# Patient Record
Sex: FEMALE | Race: WHITE | NOT HISPANIC OR LATINO | Employment: FULL TIME | URBAN - METROPOLITAN AREA
[De-identification: names, ages, dates, MRNs, and addresses within clinical notes are randomized per-mention and may not be internally consistent; named-entity substitution may affect disease eponyms.]

---

## 2017-01-03 ENCOUNTER — ALLSCRIPTS OFFICE VISIT (OUTPATIENT)
Dept: OTHER | Facility: OTHER | Age: 42
End: 2017-01-03

## 2017-01-03 LAB
GLUCOSE (HISTORICAL): NORMAL
PROT UR STRIP-MCNC: NORMAL MG/DL

## 2017-01-13 ENCOUNTER — ALLSCRIPTS OFFICE VISIT (OUTPATIENT)
Dept: OTHER | Facility: OTHER | Age: 42
End: 2017-01-13

## 2017-01-13 LAB
GLUCOSE (HISTORICAL): NORMAL
PROT UR STRIP-MCNC: NORMAL MG/DL

## 2017-01-23 ENCOUNTER — GENERIC CONVERSION - ENCOUNTER (OUTPATIENT)
Dept: OTHER | Facility: OTHER | Age: 42
End: 2017-01-23

## 2017-01-23 ENCOUNTER — ALLSCRIPTS OFFICE VISIT (OUTPATIENT)
Dept: PERINATAL CARE | Facility: CLINIC | Age: 42
End: 2017-01-23
Payer: COMMERCIAL

## 2017-01-23 PROCEDURE — 76816 OB US FOLLOW-UP PER FETUS: CPT | Performed by: OBSTETRICS & GYNECOLOGY

## 2017-01-31 ENCOUNTER — ALLSCRIPTS OFFICE VISIT (OUTPATIENT)
Dept: OTHER | Facility: OTHER | Age: 42
End: 2017-01-31

## 2017-01-31 LAB
GLUCOSE (HISTORICAL): NEGATIVE
PROT UR STRIP-MCNC: NEGATIVE MG/DL

## 2017-02-01 ENCOUNTER — GENERIC CONVERSION - ENCOUNTER (OUTPATIENT)
Dept: OTHER | Facility: OTHER | Age: 42
End: 2017-02-01

## 2017-02-01 ENCOUNTER — ALLSCRIPTS OFFICE VISIT (OUTPATIENT)
Dept: PERINATAL CARE | Facility: CLINIC | Age: 42
End: 2017-02-01
Payer: COMMERCIAL

## 2017-02-01 PROCEDURE — 76815 OB US LIMITED FETUS(S): CPT | Performed by: OBSTETRICS & GYNECOLOGY

## 2017-02-01 PROCEDURE — 59025 FETAL NON-STRESS TEST: CPT | Performed by: OBSTETRICS & GYNECOLOGY

## 2017-02-06 ENCOUNTER — ALLSCRIPTS OFFICE VISIT (OUTPATIENT)
Dept: OTHER | Facility: OTHER | Age: 42
End: 2017-02-06

## 2017-02-06 LAB
GLUCOSE (HISTORICAL): NORMAL
PROT UR STRIP-MCNC: NORMAL MG/DL

## 2017-02-08 ENCOUNTER — GENERIC CONVERSION - ENCOUNTER (OUTPATIENT)
Dept: OTHER | Facility: OTHER | Age: 42
End: 2017-02-08

## 2017-02-08 ENCOUNTER — ALLSCRIPTS OFFICE VISIT (OUTPATIENT)
Dept: PERINATAL CARE | Facility: CLINIC | Age: 42
End: 2017-02-08
Payer: COMMERCIAL

## 2017-02-08 PROCEDURE — 59025 FETAL NON-STRESS TEST: CPT | Performed by: OBSTETRICS & GYNECOLOGY

## 2017-02-08 PROCEDURE — 76815 OB US LIMITED FETUS(S): CPT | Performed by: OBSTETRICS & GYNECOLOGY

## 2017-02-14 ENCOUNTER — ALLSCRIPTS OFFICE VISIT (OUTPATIENT)
Dept: OTHER | Facility: OTHER | Age: 42
End: 2017-02-14

## 2017-02-14 LAB
GLUCOSE (HISTORICAL): NORMAL
PROT UR STRIP-MCNC: NORMAL MG/DL

## 2017-02-15 ENCOUNTER — ALLSCRIPTS OFFICE VISIT (OUTPATIENT)
Dept: PERINATAL CARE | Facility: CLINIC | Age: 42
End: 2017-02-15
Payer: COMMERCIAL

## 2017-02-15 ENCOUNTER — APPOINTMENT (OUTPATIENT)
Dept: PERINATAL CARE | Facility: CLINIC | Age: 42
End: 2017-02-15
Payer: COMMERCIAL

## 2017-02-15 ENCOUNTER — GENERIC CONVERSION - ENCOUNTER (OUTPATIENT)
Dept: OTHER | Facility: OTHER | Age: 42
End: 2017-02-15

## 2017-02-15 PROCEDURE — 76818 FETAL BIOPHYS PROFILE W/NST: CPT | Performed by: OBSTETRICS & GYNECOLOGY

## 2017-02-20 ENCOUNTER — ALLSCRIPTS OFFICE VISIT (OUTPATIENT)
Dept: OTHER | Facility: OTHER | Age: 42
End: 2017-02-20

## 2017-02-20 LAB
GLUCOSE (HISTORICAL): NORMAL
PROT UR STRIP-MCNC: NORMAL MG/DL

## 2017-02-22 ENCOUNTER — GENERIC CONVERSION - ENCOUNTER (OUTPATIENT)
Dept: OTHER | Facility: OTHER | Age: 42
End: 2017-02-22

## 2017-02-22 ENCOUNTER — APPOINTMENT (OUTPATIENT)
Dept: PERINATAL CARE | Facility: CLINIC | Age: 42
End: 2017-02-22
Payer: COMMERCIAL

## 2017-02-22 ENCOUNTER — ALLSCRIPTS OFFICE VISIT (OUTPATIENT)
Dept: PERINATAL CARE | Facility: CLINIC | Age: 42
End: 2017-02-22
Payer: COMMERCIAL

## 2017-02-22 PROCEDURE — 76818 FETAL BIOPHYS PROFILE W/NST: CPT | Performed by: OBSTETRICS & GYNECOLOGY

## 2017-02-23 ENCOUNTER — ANESTHESIA EVENT (INPATIENT)
Dept: LABOR AND DELIVERY | Facility: HOSPITAL | Age: 42
End: 2017-02-23
Payer: COMMERCIAL

## 2017-02-23 RX ORDER — SODIUM CHLORIDE, SODIUM LACTATE, POTASSIUM CHLORIDE, CALCIUM CHLORIDE 600; 310; 30; 20 MG/100ML; MG/100ML; MG/100ML; MG/100ML
125 INJECTION, SOLUTION INTRAVENOUS CONTINUOUS
Status: CANCELLED | OUTPATIENT
Start: 2017-02-24

## 2017-02-24 ENCOUNTER — ANESTHESIA (INPATIENT)
Dept: LABOR AND DELIVERY | Facility: HOSPITAL | Age: 42
End: 2017-02-24
Payer: COMMERCIAL

## 2017-02-24 ENCOUNTER — HOSPITAL ENCOUNTER (INPATIENT)
Facility: HOSPITAL | Age: 42
LOS: 3 days | Discharge: HOME | End: 2017-02-27
Attending: OBSTETRICS & GYNECOLOGY | Admitting: OBSTETRICS & GYNECOLOGY
Payer: COMMERCIAL

## 2017-02-24 DIAGNOSIS — Z98.891 PREVIOUS CESAREAN SECTION: ICD-10-CM

## 2017-02-24 PROBLEM — E03.9 HYPOTHYROIDISM: Status: ACTIVE | Noted: 2017-02-24

## 2017-02-24 PROBLEM — O99.820 GBS (GROUP B STREPTOCOCCUS CARRIER), +RV CULTURE, CURRENTLY PREGNANT: Status: ACTIVE | Noted: 2017-02-24

## 2017-02-24 PROBLEM — O24.419 GESTATIONAL DIABETES MELLITUS: Status: ACTIVE | Noted: 2017-02-24

## 2017-02-24 PROBLEM — O40.9XX0 POLYHYDRAMNIOS: Status: ACTIVE | Noted: 2017-02-24

## 2017-02-24 PROBLEM — Q27.0 SINGLE UMBILICAL ARTERY: Status: ACTIVE | Noted: 2017-02-24

## 2017-02-24 PROBLEM — E03.8 SUBCLINICAL HYPOTHYROIDISM: Status: ACTIVE | Noted: 2017-02-24

## 2017-02-24 PROBLEM — Z3A.39 39 WEEKS GESTATION OF PREGNANCY: Status: ACTIVE | Noted: 2017-02-24

## 2017-02-24 PROBLEM — N60.11 FIBROCYSTIC DISEASE OF BOTH BREASTS: Status: ACTIVE | Noted: 2017-02-24

## 2017-02-24 PROBLEM — N60.12 FIBROCYSTIC DISEASE OF BOTH BREASTS: Status: ACTIVE | Noted: 2017-02-24

## 2017-02-24 PROBLEM — O09.529 ELDERLY MULTIGRAVIDA: Status: ACTIVE | Noted: 2017-02-24

## 2017-02-24 LAB
ABO GROUP BLD: NORMAL
BASE EXCESS BLDCOA CALC-SCNC: -1.8 MMOL/L (ref 3–11)
BASE EXCESS BLDCOV CALC-SCNC: -2.8 MMOL/L (ref 1–9)
BLD GP AB SCN SERPL QL: NEGATIVE
ERYTHROCYTE [DISTWIDTH] IN BLOOD BY AUTOMATED COUNT: 13.5 % (ref 11.6–15.1)
HCO3 BLDCOA-SCNC: 25.2 MMOL/L (ref 17.3–27.3)
HCO3 BLDCOV-SCNC: 22 MMOL/L (ref 12.2–28.6)
HCT VFR BLD AUTO: 34.5 % (ref 34.8–46.1)
HGB BLD-MCNC: 11.3 G/DL (ref 11.5–15.4)
MCH RBC QN AUTO: 30.1 PG (ref 26.8–34.3)
MCHC RBC AUTO-ENTMCNC: 32.8 G/DL (ref 31.4–37.4)
MCV RBC AUTO: 92 FL (ref 82–98)
O2 CT VFR BLDCOA CALC: 4.9 ML/DL
OXYHGB MFR BLDCOA: 24.1 %
OXYHGB MFR BLDCOV: 75.4 %
PCO2 BLDCOA: 52 MM[HG] (ref 30–60)
PCO2 BLDCOV: 38.4 MM HG (ref 27–43)
PH BLDCOA: 7.3 [PH] (ref 7.23–7.43)
PH BLDCOV: 7.38 [PH] (ref 7.19–7.49)
PLATELET # BLD AUTO: 130 THOUSANDS/UL (ref 149–390)
PMV BLD AUTO: 10.6 FL (ref 8.9–12.7)
PO2 BLDCOA: 14.9 MM HG (ref 5–25)
PO2 BLDCOV: 34.4 MM HG (ref 15–45)
RBC # BLD AUTO: 3.76 MILLION/UL (ref 3.81–5.12)
RH BLD: POSITIVE
RPR SER QL: NORMAL
SAO2 % BLDCOV: 15.3 ML/DL
WBC # BLD AUTO: 6.73 THOUSAND/UL (ref 4.31–10.16)

## 2017-02-24 PROCEDURE — 86900 BLOOD TYPING SEROLOGIC ABO: CPT | Performed by: OBSTETRICS & GYNECOLOGY

## 2017-02-24 PROCEDURE — 86592 SYPHILIS TEST NON-TREP QUAL: CPT | Performed by: OBSTETRICS & GYNECOLOGY

## 2017-02-24 PROCEDURE — 82805 BLOOD GASES W/O2 SATURATION: CPT | Performed by: OBSTETRICS & GYNECOLOGY

## 2017-02-24 PROCEDURE — 0UB70ZZ EXCISION OF BILATERAL FALLOPIAN TUBES, OPEN APPROACH: ICD-10-PCS | Performed by: OBSTETRICS & GYNECOLOGY

## 2017-02-24 PROCEDURE — 86901 BLOOD TYPING SEROLOGIC RH(D): CPT | Performed by: OBSTETRICS & GYNECOLOGY

## 2017-02-24 PROCEDURE — 88302 TISSUE EXAM BY PATHOLOGIST: CPT | Performed by: OBSTETRICS & GYNECOLOGY

## 2017-02-24 PROCEDURE — 85027 COMPLETE CBC AUTOMATED: CPT | Performed by: OBSTETRICS & GYNECOLOGY

## 2017-02-24 PROCEDURE — 86850 RBC ANTIBODY SCREEN: CPT | Performed by: OBSTETRICS & GYNECOLOGY

## 2017-02-24 RX ORDER — INDOMETHACIN 25 MG/1
50 CAPSULE ORAL ONCE
Status: DISCONTINUED | OUTPATIENT
Start: 2017-02-24 | End: 2017-02-24 | Stop reason: CLARIF

## 2017-02-24 RX ORDER — DIPHENHYDRAMINE HYDROCHLORIDE 50 MG/ML
25 INJECTION INTRAMUSCULAR; INTRAVENOUS EVERY 6 HOURS PRN
Status: ACTIVE | OUTPATIENT
Start: 2017-02-24 | End: 2017-02-25

## 2017-02-24 RX ORDER — OXYTOCIN 10 [USP'U]/ML
INJECTION, SOLUTION INTRAMUSCULAR; INTRAVENOUS AS NEEDED
Status: DISCONTINUED | OUTPATIENT
Start: 2017-02-24 | End: 2017-02-24 | Stop reason: SURG

## 2017-02-24 RX ORDER — ACETAMINOPHEN 325 MG/1
650 TABLET ORAL EVERY 6 HOURS PRN
Status: DISCONTINUED | OUTPATIENT
Start: 2017-02-24 | End: 2017-02-27 | Stop reason: HOSPADM

## 2017-02-24 RX ORDER — EPHEDRINE SULFATE 50 MG/ML
INJECTION, SOLUTION INTRAVENOUS AS NEEDED
Status: DISCONTINUED | OUTPATIENT
Start: 2017-02-24 | End: 2017-02-24 | Stop reason: SURG

## 2017-02-24 RX ORDER — MORPHINE SULFATE 0.5 MG/ML
INJECTION, SOLUTION EPIDURAL; INTRATHECAL; INTRAVENOUS AS NEEDED
Status: DISCONTINUED | OUTPATIENT
Start: 2017-02-24 | End: 2017-02-24 | Stop reason: SURG

## 2017-02-24 RX ORDER — DOCUSATE SODIUM 100 MG/1
100 CAPSULE, LIQUID FILLED ORAL 2 TIMES DAILY
Status: DISCONTINUED | OUTPATIENT
Start: 2017-02-24 | End: 2017-02-27 | Stop reason: HOSPADM

## 2017-02-24 RX ORDER — DIPHENHYDRAMINE HCL 25 MG
25 TABLET ORAL EVERY 6 HOURS PRN
Status: DISCONTINUED | OUTPATIENT
Start: 2017-02-24 | End: 2017-02-27 | Stop reason: HOSPADM

## 2017-02-24 RX ORDER — CALCIUM CARBONATE 200(500)MG
1000 TABLET,CHEWABLE ORAL DAILY PRN
Status: DISCONTINUED | OUTPATIENT
Start: 2017-02-24 | End: 2017-02-27 | Stop reason: HOSPADM

## 2017-02-24 RX ORDER — ONDANSETRON 2 MG/ML
INJECTION INTRAMUSCULAR; INTRAVENOUS AS NEEDED
Status: DISCONTINUED | OUTPATIENT
Start: 2017-02-24 | End: 2017-02-24 | Stop reason: SURG

## 2017-02-24 RX ORDER — NALBUPHINE HCL 10 MG/ML
5 AMPUL (ML) INJECTION
Status: ACTIVE | OUTPATIENT
Start: 2017-02-24 | End: 2017-02-25

## 2017-02-24 RX ORDER — METOCLOPRAMIDE HYDROCHLORIDE 5 MG/ML
10 INJECTION INTRAMUSCULAR; INTRAVENOUS ONCE
Status: COMPLETED | OUTPATIENT
Start: 2017-02-24 | End: 2017-02-24

## 2017-02-24 RX ORDER — SODIUM CHLORIDE, SODIUM LACTATE, POTASSIUM CHLORIDE, CALCIUM CHLORIDE 600; 310; 30; 20 MG/100ML; MG/100ML; MG/100ML; MG/100ML
125 INJECTION, SOLUTION INTRAVENOUS CONTINUOUS
Status: DISCONTINUED | OUTPATIENT
Start: 2017-02-24 | End: 2017-02-27 | Stop reason: HOSPADM

## 2017-02-24 RX ORDER — OXYTOCIN/RINGER'S LACTATE 30/500 ML
PLASTIC BAG, INJECTION (ML) INTRAVENOUS
Status: COMPLETED
Start: 2017-02-24 | End: 2017-02-24

## 2017-02-24 RX ORDER — ONDANSETRON 2 MG/ML
4 INJECTION INTRAMUSCULAR; INTRAVENOUS EVERY 4 HOURS PRN
Status: DISPENSED | OUTPATIENT
Start: 2017-02-24 | End: 2017-02-25

## 2017-02-24 RX ORDER — ONDANSETRON 2 MG/ML
4 INJECTION INTRAMUSCULAR; INTRAVENOUS EVERY 8 HOURS PRN
Status: DISCONTINUED | OUTPATIENT
Start: 2017-02-24 | End: 2017-02-27 | Stop reason: HOSPADM

## 2017-02-24 RX ORDER — DIAPER,BRIEF,INFANT-TODD,DISP
1 EACH MISCELLANEOUS 4 TIMES DAILY PRN
Status: DISCONTINUED | OUTPATIENT
Start: 2017-02-24 | End: 2017-02-27 | Stop reason: HOSPADM

## 2017-02-24 RX ORDER — ACETAMINOPHEN 325 MG/1
650 TABLET ORAL EVERY 6 HOURS
Status: DISCONTINUED | OUTPATIENT
Start: 2017-02-24 | End: 2017-02-24

## 2017-02-24 RX ORDER — LEVOTHYROXINE SODIUM 0.05 MG/1
50 TABLET ORAL
Status: DISCONTINUED | OUTPATIENT
Start: 2017-02-25 | End: 2017-02-27 | Stop reason: HOSPADM

## 2017-02-24 RX ORDER — IBUPROFEN 600 MG/1
600 TABLET ORAL EVERY 6 HOURS PRN
Status: DISCONTINUED | OUTPATIENT
Start: 2017-02-24 | End: 2017-02-27 | Stop reason: HOSPADM

## 2017-02-24 RX ORDER — SIMETHICONE 80 MG
80 TABLET,CHEWABLE ORAL 4 TIMES DAILY PRN
Status: DISCONTINUED | OUTPATIENT
Start: 2017-02-24 | End: 2017-02-27 | Stop reason: HOSPADM

## 2017-02-24 RX ORDER — DEXAMETHASONE SODIUM PHOSPHATE 4 MG/ML
4 INJECTION, SOLUTION INTRA-ARTICULAR; INTRALESIONAL; INTRAMUSCULAR; INTRAVENOUS; SOFT TISSUE ONCE
Status: DISCONTINUED | OUTPATIENT
Start: 2017-02-24 | End: 2017-02-27 | Stop reason: HOSPADM

## 2017-02-24 RX ORDER — SODIUM CHLORIDE, SODIUM LACTATE, POTASSIUM CHLORIDE, CALCIUM CHLORIDE 600; 310; 30; 20 MG/100ML; MG/100ML; MG/100ML; MG/100ML
125 INJECTION, SOLUTION INTRAVENOUS CONTINUOUS
Status: DISCONTINUED | OUTPATIENT
Start: 2017-02-24 | End: 2017-02-24

## 2017-02-24 RX ORDER — KETOROLAC TROMETHAMINE 30 MG/ML
30 INJECTION, SOLUTION INTRAMUSCULAR; INTRAVENOUS EVERY 6 HOURS PRN
Status: ACTIVE | OUTPATIENT
Start: 2017-02-24 | End: 2017-02-25

## 2017-02-24 RX ORDER — OXYTOCIN/RINGER'S LACTATE 30/500 ML
62.5 PLASTIC BAG, INJECTION (ML) INTRAVENOUS CONTINUOUS
Status: ACTIVE | OUTPATIENT
Start: 2017-02-24 | End: 2017-02-24

## 2017-02-24 RX ORDER — MORPHINE SULFATE 2 MG/ML
2 INJECTION, SOLUTION INTRAMUSCULAR; INTRAVENOUS
Status: DISCONTINUED | OUTPATIENT
Start: 2017-02-24 | End: 2017-02-24

## 2017-02-24 RX ORDER — METOCLOPRAMIDE HYDROCHLORIDE 5 MG/ML
5 INJECTION INTRAMUSCULAR; INTRAVENOUS EVERY 6 HOURS PRN
Status: DISPENSED | OUTPATIENT
Start: 2017-02-24 | End: 2017-02-25

## 2017-02-24 RX ORDER — LEVOTHYROXINE SODIUM 0.1 MG/1
100 TABLET ORAL 2 TIMES WEEKLY
Status: DISCONTINUED | OUTPATIENT
Start: 2017-02-26 | End: 2017-02-27 | Stop reason: HOSPADM

## 2017-02-24 RX ADMIN — EPHEDRINE SULFATE 5 MG: 50 INJECTION, SOLUTION INTRAMUSCULAR; INTRAVENOUS; SUBCUTANEOUS at 08:37

## 2017-02-24 RX ADMIN — SODIUM CHLORIDE, SODIUM LACTATE, POTASSIUM CHLORIDE, AND CALCIUM CHLORIDE 1000 ML: .6; .31; .03; .02 INJECTION, SOLUTION INTRAVENOUS at 15:36

## 2017-02-24 RX ADMIN — Medication 1 TABLET: at 18:34

## 2017-02-24 RX ADMIN — Medication 62.5 MILLI-UNITS/MIN: at 10:54

## 2017-02-24 RX ADMIN — SODIUM CHLORIDE, SODIUM LACTATE, POTASSIUM CHLORIDE, AND CALCIUM CHLORIDE 125 ML/HR: .6; .31; .03; .02 INJECTION, SOLUTION INTRAVENOUS at 07:21

## 2017-02-24 RX ADMIN — SODIUM CHLORIDE, SODIUM LACTATE, POTASSIUM CHLORIDE, AND CALCIUM CHLORIDE: .6; .31; .03; .02 INJECTION, SOLUTION INTRAVENOUS at 08:00

## 2017-02-24 RX ADMIN — METOCLOPRAMIDE 10 MG: 5 INJECTION, SOLUTION INTRAMUSCULAR; INTRAVENOUS at 10:05

## 2017-02-24 RX ADMIN — SODIUM CHLORIDE, SODIUM LACTATE, POTASSIUM CHLORIDE, AND CALCIUM CHLORIDE 125 ML/HR: .6; .31; .03; .02 INJECTION, SOLUTION INTRAVENOUS at 17:02

## 2017-02-24 RX ADMIN — OXYTOCIN 20 UNITS: 10 INJECTION, SOLUTION INTRAMUSCULAR; INTRAVENOUS at 08:47

## 2017-02-24 RX ADMIN — MORPHINE SULFATE 0.25 MG: 0.5 INJECTION, SOLUTION EPIDURAL; INTRATHECAL; INTRAVENOUS at 08:25

## 2017-02-24 RX ADMIN — ONDANSETRON 4 MG: 2 INJECTION INTRAMUSCULAR; INTRAVENOUS at 09:53

## 2017-02-24 RX ADMIN — ONDANSETRON 4 MG: 2 INJECTION INTRAMUSCULAR; INTRAVENOUS at 08:40

## 2017-02-24 RX ADMIN — SODIUM CHLORIDE, SODIUM LACTATE, POTASSIUM CHLORIDE, AND CALCIUM CHLORIDE 125 ML/HR: .6; .31; .03; .02 INJECTION, SOLUTION INTRAVENOUS at 10:05

## 2017-02-24 RX ADMIN — CEFAZOLIN SODIUM 2000 MG: 2 SOLUTION INTRAVENOUS at 08:24

## 2017-02-24 RX ADMIN — SODIUM CHLORIDE, SODIUM LACTATE, POTASSIUM CHLORIDE, AND CALCIUM CHLORIDE 1000 ML: .6; .31; .03; .02 INJECTION, SOLUTION INTRAVENOUS at 06:46

## 2017-02-24 RX ADMIN — DOCUSATE SODIUM 100 MG: 100 CAPSULE, LIQUID FILLED ORAL at 18:34

## 2017-02-25 LAB
BASOPHILS # BLD AUTO: 0.01 THOUSANDS/ΜL (ref 0–0.1)
BASOPHILS NFR BLD AUTO: 0 % (ref 0–1)
EOSINOPHIL # BLD AUTO: 0.02 THOUSAND/ΜL (ref 0–0.61)
EOSINOPHIL NFR BLD AUTO: 0 % (ref 0–6)
ERYTHROCYTE [DISTWIDTH] IN BLOOD BY AUTOMATED COUNT: 13.6 % (ref 11.6–15.1)
HCT VFR BLD AUTO: 29.1 % (ref 34.8–46.1)
HGB BLD-MCNC: 9.6 G/DL (ref 11.5–15.4)
LYMPHOCYTES # BLD AUTO: 1.49 THOUSANDS/ΜL (ref 0.6–4.47)
LYMPHOCYTES NFR BLD AUTO: 18 % (ref 14–44)
MCH RBC QN AUTO: 30.7 PG (ref 26.8–34.3)
MCHC RBC AUTO-ENTMCNC: 33 G/DL (ref 31.4–37.4)
MCV RBC AUTO: 93 FL (ref 82–98)
MONOCYTES # BLD AUTO: 0.83 THOUSAND/ΜL (ref 0.17–1.22)
MONOCYTES NFR BLD AUTO: 10 % (ref 4–12)
NEUTROPHILS # BLD AUTO: 5.83 THOUSANDS/ΜL (ref 1.85–7.62)
NEUTS SEG NFR BLD AUTO: 72 % (ref 43–75)
NRBC BLD AUTO-RTO: 0 /100 WBCS
PLATELET # BLD AUTO: 110 THOUSANDS/UL (ref 149–390)
PMV BLD AUTO: 10.9 FL (ref 8.9–12.7)
RBC # BLD AUTO: 3.13 MILLION/UL (ref 3.81–5.12)
WBC # BLD AUTO: 8.24 THOUSAND/UL (ref 4.31–10.16)

## 2017-02-25 PROCEDURE — 85025 COMPLETE CBC W/AUTO DIFF WBC: CPT | Performed by: OBSTETRICS & GYNECOLOGY

## 2017-02-25 RX ORDER — OXYCODONE HYDROCHLORIDE AND ACETAMINOPHEN 5; 325 MG/1; MG/1
1 TABLET ORAL EVERY 4 HOURS PRN
Status: DISCONTINUED | OUTPATIENT
Start: 2017-02-25 | End: 2017-02-27 | Stop reason: HOSPADM

## 2017-02-25 RX ORDER — KETOROLAC TROMETHAMINE 30 MG/ML
15 INJECTION, SOLUTION INTRAMUSCULAR; INTRAVENOUS ONCE
Status: COMPLETED | OUTPATIENT
Start: 2017-02-25 | End: 2017-02-25

## 2017-02-25 RX ORDER — OXYCODONE HYDROCHLORIDE AND ACETAMINOPHEN 5; 325 MG/1; MG/1
2 TABLET ORAL EVERY 4 HOURS PRN
Status: DISCONTINUED | OUTPATIENT
Start: 2017-02-25 | End: 2017-02-27 | Stop reason: HOSPADM

## 2017-02-25 RX ADMIN — KETOROLAC TROMETHAMINE 15 MG: 30 INJECTION, SOLUTION INTRAMUSCULAR at 22:19

## 2017-02-25 RX ADMIN — Medication 1 TABLET: at 09:09

## 2017-02-25 RX ADMIN — IBUPROFEN 600 MG: 600 TABLET, FILM COATED ORAL at 15:36

## 2017-02-25 RX ADMIN — ONDANSETRON 4 MG: 2 INJECTION INTRAMUSCULAR; INTRAVENOUS at 21:32

## 2017-02-25 RX ADMIN — SODIUM CHLORIDE, SODIUM LACTATE, POTASSIUM CHLORIDE, AND CALCIUM CHLORIDE 125 ML/HR: .6; .31; .03; .02 INJECTION, SOLUTION INTRAVENOUS at 01:09

## 2017-02-25 RX ADMIN — DOCUSATE SODIUM 100 MG: 100 CAPSULE, LIQUID FILLED ORAL at 09:09

## 2017-02-25 RX ADMIN — LEVOTHYROXINE SODIUM 50 MCG: 50 TABLET ORAL at 06:14

## 2017-02-25 RX ADMIN — IBUPROFEN 600 MG: 600 TABLET, FILM COATED ORAL at 06:15

## 2017-02-25 RX ADMIN — ACETAMINOPHEN 650 MG: 325 TABLET, FILM COATED ORAL at 10:23

## 2017-02-25 RX ADMIN — SIMETHICONE CHEW TAB 80 MG 80 MG: 80 TABLET ORAL at 21:32

## 2017-02-25 RX ADMIN — DOCUSATE SODIUM 100 MG: 100 CAPSULE, LIQUID FILLED ORAL at 20:01

## 2017-02-26 RX ADMIN — DOCUSATE SODIUM 100 MG: 100 CAPSULE, LIQUID FILLED ORAL at 17:40

## 2017-02-26 RX ADMIN — IBUPROFEN 600 MG: 600 TABLET, FILM COATED ORAL at 15:01

## 2017-02-26 RX ADMIN — SIMETHICONE CHEW TAB 80 MG 80 MG: 80 TABLET ORAL at 06:07

## 2017-02-26 RX ADMIN — LEVOTHYROXINE SODIUM 100 MCG: 100 TABLET ORAL at 06:05

## 2017-02-26 RX ADMIN — DOCUSATE SODIUM 100 MG: 100 CAPSULE, LIQUID FILLED ORAL at 09:08

## 2017-02-26 RX ADMIN — Medication 1 TABLET: at 09:08

## 2017-02-27 VITALS
OXYGEN SATURATION: 98 % | RESPIRATION RATE: 20 BRPM | HEIGHT: 61 IN | HEART RATE: 64 BPM | DIASTOLIC BLOOD PRESSURE: 52 MMHG | SYSTOLIC BLOOD PRESSURE: 113 MMHG | BODY MASS INDEX: 32.1 KG/M2 | TEMPERATURE: 98.2 F | WEIGHT: 170 LBS

## 2017-02-27 RX ORDER — ACETAMINOPHEN 325 MG/1
650 TABLET ORAL EVERY 6 HOURS PRN
Qty: 30 TABLET | Refills: 0 | Status: SHIPPED | OUTPATIENT
Start: 2017-02-27 | End: 2017-03-29

## 2017-02-27 RX ORDER — CALCIUM CARBONATE 200(500)MG
1000 TABLET,CHEWABLE ORAL DAILY PRN
Refills: 0 | Status: SHIPPED | OUTPATIENT
Start: 2017-02-27 | End: 2017-03-29

## 2017-02-27 RX ORDER — OXYCODONE HYDROCHLORIDE AND ACETAMINOPHEN 5; 325 MG/1; MG/1
1-2 TABLET ORAL EVERY 4 HOURS PRN
Qty: 28 TABLET | Refills: 0 | Status: SHIPPED | OUTPATIENT
Start: 2017-02-27 | End: 2017-03-09

## 2017-02-27 RX ORDER — IBUPROFEN 600 MG/1
600 TABLET ORAL EVERY 6 HOURS PRN
Qty: 30 TABLET | Refills: 0 | Status: SHIPPED | OUTPATIENT
Start: 2017-02-27 | End: 2017-03-29

## 2017-02-27 RX ADMIN — DOCUSATE SODIUM 100 MG: 100 CAPSULE, LIQUID FILLED ORAL at 09:05

## 2017-02-27 RX ADMIN — IBUPROFEN 600 MG: 600 TABLET, FILM COATED ORAL at 06:05

## 2017-02-27 RX ADMIN — LEVOTHYROXINE SODIUM 50 MCG: 50 TABLET ORAL at 06:05

## 2017-02-27 RX ADMIN — Medication 1 TABLET: at 09:05

## 2017-03-06 ENCOUNTER — GENERIC CONVERSION - ENCOUNTER (OUTPATIENT)
Dept: OTHER | Facility: OTHER | Age: 42
End: 2017-03-06

## 2017-03-08 LAB — PLACENTA IN STORAGE: NORMAL

## 2017-03-20 ENCOUNTER — GENERIC CONVERSION - ENCOUNTER (OUTPATIENT)
Dept: OTHER | Facility: OTHER | Age: 42
End: 2017-03-20

## 2017-04-10 ENCOUNTER — GENERIC CONVERSION - ENCOUNTER (OUTPATIENT)
Dept: OTHER | Facility: OTHER | Age: 42
End: 2017-04-10

## 2017-11-21 ENCOUNTER — ALLSCRIPTS OFFICE VISIT (OUTPATIENT)
Dept: OTHER | Facility: OTHER | Age: 42
End: 2017-11-21

## 2017-11-22 NOTE — PROGRESS NOTES
Assessment    1  Encounter for annual routine gynecological examination () (Z01 419)    Plan  Screening for hypercholesterolemia    · (1) LIPID PANEL, FASTING; Status:Active - Retrospective By Protocol Authorization; Requested MARIANNE:[id number];    Perform:[facility]; KGJ:[id number]; Last Updated Deepika Mercer; 2017 11:16:57 AM;Ordered;for hypercholesterolemia; Ordered By:Sarah Roque;    Discussion/Summary    1  Pap smear deferred due to low risk statusEncouraged self-breast examination as well as calcium supplementationRecommend mammogram once patient discontinues breast-feedingPatient requesting lipid panel, history of borderline hypercholesterolemia  I will notify her of these results via telephoneReturn to office in 1 year pap deferred  The patient has the current Goals: Continue preventative screening  The patent has the current Barriers: No barriers  Chief Complaint  annual visit      History of Present Illness  HPI: This is a 78-year-old female  who presents for her annual gyn exam  Her baby is now 6month-old  She had a repeat  with tubal ligation  She is breast feeding  Her menstrual cycles are regular every 4 weeks lasting 5-6 days with no breakthrough bleeding  She denies any changes in bowel or bladder function  She is sexually active and has been in a monogamous relationship with her  for over 7 years  Her Pap smears have been normal in the last 10 years  Her last Pap smear was 2016 within normal limits,      Review of Systems   Cardiovascular: no complaints of slow or fast heart rate, no chest pain, no palpitations, no leg claudication or lower extremity edema  Respiratory: no complaints of shortness of breath, no wheezing, no dyspnea on exertion, no orthopnea or PND  Breasts: no complaints of breast pain, breast lump or nipple discharge    Gastrointestinal: no complaints of abdominal pain, no constipation, no nausea or diarrhea, no vomiting, no bloody stools  Genitourinary: no complaints of dysuria, no incontinence, no pelvic pain, no dysmenorrhea, no vaginal discharge or abnormal vaginal bleeding  Over the past 2 weeks, how often have you been bothered by the following problems? 1 ) Little interest or pleasure in doing things? Not at all   2 ) Feeling down, depressed or hopeless? Not at all   3 ) Trouble falling asleep or sleeping too much? Not at all   4 ) Feeling tired or having little energy? Not at all   5 ) Poor appetite or overeating? Not at all   6 ) Feeling bad about yourself, or that you are a failure, or have let yourself or your family down? Not at all   7 ) Trouble concentrating on things, such as reading a newspaper or watching television? Not at all   8 ) Moving or speaking so slowly that other people could have noticed, or the opposite, moving or speaking faster than usual? Not at all  How difficult have these problems made it for you to do your work, take care of things at home, or get along with people? Not at all  Score      ROS reviewed  OB History  Pregnancy History (Brief):  Prior pregnancies: : 2  Para:  Delivery type: 2  section       Active Problems    1  Currently pregnant (V22 2) (Z34 90)   2  Elderly multigravida, third trimester (659 63) (O09 523)   3  Encounter for annual routine gynecological examination (V72 31) (Z01 419)   4  H/O  section (V45 89) (Z98 891)   5  H/O polyhydramnios (V13 29) (Z87 59)   6  History of gestational diabetes mellitus (GDM) (V12 21) (Z86 32)   7  Polyhydramnios in lin pregnancy in third trimester (657 03) (O40 3XX0)   8  Polyhydramnios, antepartum complication (911 60) (J83 3BQ2)   9  Post-op pain (338 18) (G89 18)   10  Pregnancy resulting from in vitro fertilization (V23 85) (O09 819)   11  Single umbilical artery, maternal, antepartum (663 83) (O09 899)   12   Subclinical hypothyroidism (244 8) (E03 9)    Past Medical History     · History of Elderly primigravida, antepartum (659 53) (O09 519)   · History of Excessive fetal growth affecting management of mother, antepartum, thirdtrimester, fetus 1 (119 56) (N38 09U4)   · History of Fetal anomaly (759 7) (Q89 7)    The active problems and past medical history were reviewed and updated today  Surgical History   · History of  Section   · History of Oral Surgery Tooth Extraction   · History of Tubal Ligation    The surgical history was reviewed and updated today  Family History  Mother    · No pertinent family history  Father    · Family history of hypertension (V17 49) (Z82 49)   · Family history of High blood cholesterol  Sibling    · Family history of Type 1 diabetes    The family history was reviewed and updated today  Social History     · Never smoker   · No alcohol use   · No drug use  The social history was reviewed and updated today  Current Meds   1  Prenatal Vitamin TABS; TAKE 1 TABLET DAILY; Therapy: (Recorded:54Fed6146) to Recorded    Allergies  1  Erythromycin TABS  2  Seasonal    Vitals   Recorded: 55NEE3501 26:19KT   Systolic 583   Diastolic 72   Height 5 ft 2 in   Weight 123 lb    BMI Calculated 22 5   BSA Calculated 1 55       Physical Exam   Constitutional  General appearance: No acute distress, well appearing and well nourished  Pulmonary  Auscultation of lungs: Clear to auscultation  Cardiovascular  Auscultation of heart: Normal rate and rhythm, normal S1 and S2, no murmurs  Genitourinary  External genitalia: Normal and no lesions appreciated  Vagina: Normal, no lesions or dryness appreciated  Urethral meatus: Normal    Cervix: Normal, no palpable masses  Uterus: Normal, non-tender, not enlarged, and no palpable masses  Adnexa/parametria: Normal, non-tender and no fullness or masses appreciated  Chest  Breasts: Normal and no dimpling or skin changes noted  Abdomen  Abdomen: Normal, non-tender, and no organomegaly noted         Signatures Electronically signed by : Terence Vences DO; Nov 21 2017 12:13PM EST                       (Author)

## 2018-01-10 NOTE — PROGRESS NOTES
OCT 17 2016         RE: Yessica Larkin                                   To: [facility]   MR#: [id number]                                   [address] [place] [address]   : [address] [place] [address] Street: [id number]                             Fax: [phone]   (Exam #: [id number])      The LMP of this 42year old,  G2, P0-1-0-1 patient was unknown, her   working DENNIS is MAR 1 2017 and the current gestational age is 20 weeks 5   days by IV Fertilization  A sonographic examination was performed on OCT   17 2016 using real time equipment  The ultrasound examination was   performed using abdominal & vaginal techniques  The patient has a BMI of   27 4  Her blood pressure today was 121/53        IVF pregnancy      Cardiac motion was observed at 147 bpm       INDICATIONS      advanced maternal age   previous  (s)   prior PPROM   previous  delivery   in vitro fertilization   fetal anatomical survey      Exam Types      Transvaginal   LEVEL II      RESULTS      Fetus # 1 of 1   Breech presentation   Fetal growth appeared normal   Placenta Location = Anterior   No placenta previa   Placenta Grade = II      MEASUREMENTS (* Included In Average GA)      AC              16 6 cm        21 weeks 3 days* (63%)   BPD              4 9 cm        20 weeks 5 days* (49%)   HC              18 4 cm        20 weeks 4 days* (46%)   Femur            3 3 cm        20 weeks 4 days* (39%)      Nuchal Fold      4 7 mm      Humerus          3 2 cm        20 weeks 4 days  (46%)   Radius           2 7 cm        21 weeks 1 day   Ulna             3 1 cm        21 weeks 4 days   Tibia            2 8 cm        20 weeks 1 day   (23%)   Fibula           2 8 cm        19 weeks 2 days   Foot             3 3 cm        20 weeks 2 days      Cerebellum       2 1 cm        21 weeks 0 days   Biorbit          3 3 cm        21 weeks 0 days   CisternaMagna    5 5 mm      HC/AC           1 10   FL/AC 0 20   FL/BPD          0 68   EFW (Ac/Fl/Hc)   392 grams - 0 lbs 14 oz      THE AVERAGE GESTATIONAL AGE is 20 weeks 6 days +/- 10 days  AMNIOTIC FLUID         Largest Vertical Pocket = 5 9 cm   Amniotic Fluid: Normal      CERVICAL EVALUATION      SUPINE      Cervical Length: 3 00 cm      OTHER TEST RESULTS           Funneling?: No             Dynamic Changes?: No        Resp  To TFP?: No      ANATOMY      Head                                    Normal   Face/Neck                               Normal   Th  Cav  Normal   Heart                                   Normal   Abd  Cav  Normal   Stomach                                 Normal   Right Kidney                            Normal   Left Kidney                             Normal   Bladder                                 Normal   Abd  Wall                               Normal   Spine                                   Normal   Extrems                                 Normal   Genitalia                               Normal   Placenta                                Normal   Umbl  Cord                              Abnormal   Uterus                                  Normal   PCI                                     Normal      ANATOMY DETAILS      Visualized Appearing Sonographically Normal:   HEAD: (Calvarium, BPD Level, Cavum, Lateral Ventricles, Choroid Plexus,   Cerebellum, Cisterna Magna);    FACE/NECK: (Neck, Nuchal Fold, Profile,   Orbits, Nose/Lips, Palate, Face);    TH  CAV  : (Lungs, Diaphragm); HEART: (Four Chamber View, Proximal Left Outflow, Proximal Right Outflow,   3 Vessel Trachea, Short Axis of Greater Vessels, Ductal Arch, Aortic Arch,   Interventricular Septum, Interatrial Septum, IVC, SVC, Cardiac Axis,   Cardiac Position);    ABD  CAV : (Liver);    STOMACH, RIGHT KIDNEY, LEFT   KIDNEY, BLADDER, ABD   WALL, SPINE: (Cervical Spine, Thoracic Spine, Lumbar   Spine, Sacrum);    EXTREMS: (Lt Humerus, Rt Humerus, Lt Forearm, Rt   Forearm, Lt Hand, Rt Hand, Lt Femur, Rt Femur, Lt Low Leg, Rt Low Leg, Lt   Foot, Rt Foot);    GENITALIA (Female), PLACENTA, UTERUS, PCI      Abnormal:   UMBL  CORD      ADNEXA      The left ovary appeared normal and measured 2 7 x 2 1 x 1 3 cm with a   volume of 3 9 cc  The right ovary appeared normal and measured 3 0 x 2 8 x   1 5 cm with a volume of 6 6 cc  IMPRESSION      Kelsey IUP   20 weeks and 6 days by this ultrasound  (DENNIS=FEB 28 2017)   20 weeks and 5 days by IV Fertilization  (DENNIS=MAR 1 2017)   Breech presentation   Fetal growth appeared normal   Regular fetal heart rate of 147 bpm   Marginal PCI   Two Vessel umbilical cord   Anterior placenta   No placenta previa      GENERAL COMMENT      OFFICE VISIT      On exam today the patient appears well, in no acute distress, and denies   any complaints  Her abdomen is non-tender  The patient had noninvasive prenatal testing through [facility] test   Her results were normal, placing her in a   very low risk category  The sensitivity of detecting Trisomy 21 with this   test is 99 1% with a specificity of 99 9%  The sensitivity of detecting   Trisomy 18 is >99 9% with a specificity of 99 6%  The sensitivity of   detecting Trisomy 13 is 91 7% with a specificity of 99 7%  Her negative   result is very reassuring to rule out the aforementioned Trisomies  The fetal anatomic survey is complete  As was previously appreciated,   there appears to be a single umbilical artery  The right umbilical artery   appears absent  The presence of a  two vessel cord can be associated with   the development of fetal growth restriction and cardiac anomalies  Fetal   echocardiography is recommended  Good fetal movement and tone are seen  The amniotic fluid volume appears normal   The placenta is anterior fundal   with a marginal placental cord insertion site    A transvaginal ultrasound   was performed to assess the cervix, which was not seen well   transabdominally  The cervical length was 3 0 centimeters, which is   normal for the current gestational age  There was no significant   funneling or dynamic changes appreciated  The patient was informed of   today's findings and all of her questions were answered  The limitations   of ultrasound were reviewed with the patient, which she appears to   understand  Munir Downs was counseled regarding and received a flu vaccine today  We   discussed follow-up in detail and she will return in 2-4 weeks for fetal   echocardiography for the indication of in vitro fertilization and a single   umbilical artery  A growth ultrasound scheduled at around 28 weeks  Thank you very much for allowing us to participate in the care of this   very nice patient  Should you have any questions, please do not hesitate   to contact our office  Please note, in addition to the time spent discussing the results of the   ultrasound, I spent approximately 10 minutes of face-to-face time with the   patient, greater than 50% of which was spent in counseling and the   coordination of care for this patient  CARI Younger M D     Electronically signed 10/17/16 11:02

## 2018-01-10 NOTE — PROGRESS NOTES
FEB 15 2017         RE: Bridget Hall                                   To: [facility]   MR#: [id number]                                   [address] [facility]   : [address] [place] [address] Street: [id number]                             Fax: [phone]   (Exam #: [id number])      The LMP of this 42year old,  G2, P0-1-0-1 patient was unknown, her   working DENNIS is MAR 1 2017 and the current gestational age is 37 weeks 0   days by IV Fertilization  A sonographic examination was performed on FEB   15 2017 using real time equipment  The ultrasound examination was   performed using abdominal technique  The patient has a BMI of 30 7  Her   blood pressure today was 112/62  IVF pregnancy using 21year-old egg and her 's sperm  The embryo   transfer occurred on  using a 6-7 day frozen embryo which gave in due   date of  17      Problem list   1  History of a  section for a failed induction after a 36 week 3   day PPROM  In that pregnancy she had polyhydramnios and delivered a 6 lbs  9 oz  baby at birth  She declined Aspers in this pregnancy as her baby did   well and went home with her  She would like a repeat   2  Advanced maternal age of 42 - Donor IVF age 20  Had normal   preimplantation genetics completed  3  Marginal placental cord insertion   4  2 vessel cord- Fetal Echo was normal   5  Polyhydramnios was noted at 37 weeks  Cardiac motion was observed at 136 bpm       INDICATIONS      advanced maternal age   polyhydramnios      Exam Types      Biophysical Profile      RESULTS      Fetus # 1 of 1   Vertex presentation   Placenta Location = Anterior   No placenta previa   Placenta Grade = II      The NST was reactive with no decelerations        AMNIOTIC FLUID      Q1: 3 9      Q2: 9 3      Q3: 4 5      Q4: 4 1   DARY Total = 21 7 cm   Largest Vertical Pocket = 9 3 cm   Amniotic Fluid: POLYHYDRAMNIOS      BIOPHYSICAL PROFILE      The Biophysical Profile score was 10/10  Breathin  Movement: 2  Tone: 2  AFV: 2  NST: 2   The NST was reactive with no decelerations  IMPRESSION      Kelsey IUP   38 weeks and 0 days by IV Fertilization  (DENNIS=MAR 1 2017)   Vertex presentation   Regular fetal heart rate of 136 bpm   Polyhydramnios   Anterior placenta   No placenta previa      RECOMMENDATION      BPP: 1 Week      CARI Campos M D     Maternal-Fetal Medicine   Electronically signed 02/15/17 10:47

## 2018-01-12 VITALS
DIASTOLIC BLOOD PRESSURE: 70 MMHG | BODY MASS INDEX: 31.1 KG/M2 | WEIGHT: 169 LBS | HEIGHT: 62 IN | SYSTOLIC BLOOD PRESSURE: 110 MMHG

## 2018-01-12 VITALS
WEIGHT: 170 LBS | BODY MASS INDEX: 31.28 KG/M2 | DIASTOLIC BLOOD PRESSURE: 51 MMHG | HEIGHT: 62 IN | SYSTOLIC BLOOD PRESSURE: 108 MMHG

## 2018-01-12 VITALS
WEIGHT: 123 LBS | DIASTOLIC BLOOD PRESSURE: 72 MMHG | BODY MASS INDEX: 22.63 KG/M2 | SYSTOLIC BLOOD PRESSURE: 100 MMHG | HEIGHT: 62 IN

## 2018-01-12 VITALS
DIASTOLIC BLOOD PRESSURE: 66 MMHG | BODY MASS INDEX: 31.21 KG/M2 | SYSTOLIC BLOOD PRESSURE: 125 MMHG | WEIGHT: 169.6 LBS | HEIGHT: 62 IN

## 2018-01-12 NOTE — PROGRESS NOTES
AUG 22 2016         RE: Fady Gupta                                   To: [facility]   MR#: [id number]                                   [facility]   : [address] [place] [address] Street: [id number]                             Fax: [phone]   (Exam #: [id number])      The LMP of this 42year old,  G2, P0-1-0-1 patient was unknown, her   working DENNIS is MAR 1 2017 and the current gestational age is 12 weeks 5   days by IV Fertilization  A sonographic examination was performed on AUG   22 2016 using real time equipment  The ultrasound examination was   performed using abdominal technique  The patient has a BMI of 25 4  Her   blood pressure today was 121/49  IVF pregnancy         Gertrude Nagel conceived this pregnancy through IVF using a frozen 6-7 day old   embryo which was transferred on   Her due date given to her by Dr Rain Bay is 17  This embryo was conceived through donor eggs    that were 21years of age and her 's sperm  She did have   preimplantation genetics done prior to embryo transfer  Only one embryo   was transferred  Her prior pregnancy in  utilized the same donor egg   and sperm and her baby delivered at 36w3d weighing 6 lbs  9 oz by    section secondary to arrest of dilation  Patient reports that in that   pregnancy she was followed for polyhydramnios and developed PPROM at 36   weeks and 3 days  She was told by her OB provider that she has a small   pelvis and she would like a repeat  with this pregnancy  by her   report her baby did well and did not require a NICU stay and went home   with Gertrude Nagel  She reports she is on levothyroxine 50 mcg daily that was   started in pregnancy by Dr Rain Bay for subclinical hypothyroidism and   prenatal vitamins  She reports an allergy to erythromycin that causes her   to vomit   Her past medical history is significant for infertility and 2   migraines per year and her surgical history is significant for a    section and removal of wisdom teeth  She reports a first generation family   history of type 1 diabetes in her brother and denies any other first   generation family history of hypertension, thrombosis or congenital   anomalies  She denies any use of cigarettes, alcohol or illicit drugs  She reports she had an early Glucola that was normal       Multiple longitudinal and transverse sections revealed a lin   intrauterine pregnancy with the fetus in breech presentation  The placenta   is anterior in implantation, grade I in appearance  Cardiac motion was observed at 166 bpm       INDICATIONS      advanced maternal age   previous  (s)   prior PPROM   previous  delivery   in vitro fertilization   first trimester genetic screening      Exam Types      sequential screen      RESULTS      Fetus # 1 of 1   Breech presentation   Fetal growth appeared normal      MEASUREMENTS (* Included In Average GA)      CRL              6 1 cm        12 weeks 2 days*   Nuchal Trans    1 70 mm      THE AVERAGE GESTATIONAL AGE is 12 weeks 2 days +/- 7 days  UTERINE ARTERIES                                  S/D   PI    RI    NOTCH       Left Uterine Artery        3 19  1 31  0 69       Right Uterine Artery       2 63  1 09  0 62      ANATOMY COMMENTS      Anatomic detail is limited at this gestational age  The yolk sac was not   noted  The fetal cranium appeared normal in shape and the nuchal   translucency was normal in size (1 7 mm)  The nasal bone appears to be   present  The intracranial anatomy was unremarkable  Evaluation of the   spine revealed no obvious evidence for a neural tube defect  Anatomy of   the fetal thorax appeared within normal limits  The cardiac rhythm was   regular  Within the abdomen, stomach & bladder were visualized and the   abdominal wall appeared intact    Active movement of the fetal body &   extremities was seen   There is suspicion of a subchorionic bleed   measuring 1 2 x 1 3 x 2 2  The placental cord insertion was normal    The   uterine artery Dopplers are normal for this gestational age  There is no   suspicion of a uterine myoma  Free fluid is not seen in the posterior   cul-de-sac  ADNEXA      The left ovary appeared normal and measured 2 3 x 1 8 x 1 6 cm with a   volume of 3 5 cc  The right ovary appeared normal and measured 2 6 x 1 7 x   1 1 cm with a volume of 2 5 cc  AMNIOTIC FLUID         Largest Vertical Pocket = 3 1 cm   Amniotic Fluid: Normal      IMPRESSION      Kelsey IUP   12 weeks and 2 days by this ultrasound  (DENNIS=MAR 4 2017)   12 weeks and 5 days by IV Fertilization  (DENNIS=MAR 1 2017)   Breech presentation   Fetal growth appeared normal   Regular fetal heart rate of 166 bpm   Anterior placenta      GENERAL COMMENT      As per your request, a consultation was performed on your patient for the   following indication: AMA, IVF pregnancy, history of  section and   subclinical hypothyroidism  A 2 vessel cord was seen on her ultrasound   today  The patient was informed of the findings and counseled about the   limitations of the exam in detecting all forms of fetal congenital   abnormalities  She denies any vaginal bleeding or uterine cramping/contractions  Exam shows she is comfortable and her abdomen is non tender  Follow up recommended:   1  Kelsey IVF pregnancies with or without ICSI are at a higher risk of    birth and low birth weight ( <2500 gms)  Women who undergo ART   appear to be at increased risk of delivering offspring with congenital   malformations compared with fertile women who conceive naturally, but the   reason for this increase is unclear  The increased risk of birth defects   was observed for all major organ systems, and was highest for the nervous   system  A small increased risk for cardiac malformations is also noted  Follow-up will include a 20 week anatomy scan, 24 week fetal echo, 28 and   34 week growth scans  Parents are aware that despite that they had   preimplantation genetics that some embryos may be abnormal secondary to   uniparental disomy or mosaicism that can develop in early meiosis  they   are interested in NIPT but declines any invasive genetic screening   secondary to an increased risk of loss  2   A single umbilical artery (SUA) was seen  No other anomalies were   detected but her scan is very limited in the first gestation of pregnancy  There is an increased risk for associated fetal anomalies, including   cardiac and renal abnormalities  An isolated SUA is associated with an   increased risk for IUGR and stillbirth  Fetal echo cardiography is   recommended as follow-up  3  Hx of a  section  Her placenta is anterior and appears to be   near her prior  scar  There is no sign of accreta on today's   ultrasound but will review this on her future scans as well  She declines   a   4  Advanced maternal age  Recommend offering weekly nonstress tests/fluid   scans from 36 weeks on secondary to the slightly increased risk for   stillbirth in patients whose age is 36 or above  5   delivery at 36 weeks 3 days which may be related to her   polyhydramnios in her last pregnancy  We discussed the option of Vivian   which she has declined  recommend screening for a UTI, GC, chlamydia and   bacterial vaginosis and treating with oral antibiotics if any are found  CARI Albarran M D     Maternal-Fetal Medicine   Electronically signed 16 19:41

## 2018-01-12 NOTE — PROGRESS NOTES
2017         RE: Constance Ernst                                   To: [facility]   MR#: [id number]                                   [address]   : [address]: [id number]                             Fax: [phone]   (Exam #: [id number])      The LMP of this 42year old,  G2, P0-1-0-1 patient was unknown, her   working DENNIS is MAR 1 2017 and the current gestational age is 43 weeks 0   days by IV Fertilization  A sonographic examination was performed on 2017 using real time equipment  The ultrasound examination was   performed using abdominal technique  The patient has a BMI of 31 1  Her   blood pressure today was 108/51  IVF pregnancy using 21year-old egg and her 's sperm  The embryo   transfer occurred on  using a 6-7 day frozen embryo which gave in due   date of  17      Problem list   1  History of a  section for a failed induction after a 36 week 3   day PPROM  In that pregnancy she had polyhydramnios and delivered a 6 lbs  9 oz  baby at birth  She declined [place] in this pregnancy as her baby did   well and went home with her  She would like a repeat   2  Advanced maternal age of 42 - Donor IVF age 20  Had normal   preimplantation genetics completed  3  Marginal placental cord insertion   4  2 vessel cord- Fetal Echo was normal   5  Polyhydramnios was noted at 37 weeks  Cardiac motion was observed at 126 bpm       INDICATIONS      advanced maternal age   polyhydramnios      Exam Types      Biophysical Profile      RESULTS      Fetus # 1 of 1   Vertex presentation   Placenta Location = Anterior   No placenta previa   Placenta Grade = II      The NST was reactive with no decelerations        AMNIOTIC FLUID      Q1: 5 8      Q2: 8 1      Q3: 8 8      Q4: 8 6   DARY Total = 31 3 cm   Amniotic Fluid: POLYHYDRAMNIOS      BIOPHYSICAL PROFILE      The Biophysical Profile score was 10/10  Breathin  Movement: 2  Tone: 2  AFV: 2  NST: 2   The NST was reactive with no decelerations  IMPRESSION      Kelsey IUP   39 weeks and 0 days by IV Fertilization  (DENNIS=MAR 1 2017)   Vertex presentation   Regular fetal heart rate of 126 bpm   Polyhydramnios   Anterior placenta   No placenta previa      RECOMMENDATION      BPP: 1 Week      CARI Campos M D     Maternal-Fetal Medicine   Electronically signed 17 17:50

## 2018-01-13 VITALS
HEIGHT: 62 IN | BODY MASS INDEX: 30.55 KG/M2 | DIASTOLIC BLOOD PRESSURE: 60 MMHG | SYSTOLIC BLOOD PRESSURE: 110 MMHG | WEIGHT: 166 LBS

## 2018-01-13 VITALS
BODY MASS INDEX: 31.14 KG/M2 | DIASTOLIC BLOOD PRESSURE: 52 MMHG | SYSTOLIC BLOOD PRESSURE: 122 MMHG | HEIGHT: 62 IN | WEIGHT: 169.2 LBS

## 2018-01-13 VITALS
BODY MASS INDEX: 30.44 KG/M2 | HEIGHT: 62 IN | DIASTOLIC BLOOD PRESSURE: 55 MMHG | SYSTOLIC BLOOD PRESSURE: 108 MMHG | WEIGHT: 165.4 LBS

## 2018-01-13 VITALS
WEIGHT: 168 LBS | HEIGHT: 62 IN | BODY MASS INDEX: 30.91 KG/M2 | SYSTOLIC BLOOD PRESSURE: 122 MMHG | DIASTOLIC BLOOD PRESSURE: 68 MMHG

## 2018-01-13 NOTE — PROGRESS NOTES
2017         RE: Kevin Silva                                   To: [facility]   MR#: [id number]                                   [address]   : [address]: [id number]                             Fax: [phone]   (Exam #: [id number])      The LMP of this 42year old,  G2, P0-1-0-1 patient was unknown, her   working DENNIS is MAR 1 2017 and the current gestational age is 42 weeks 0   days by IV Fertilization  A sonographic examination was performed on 2017 using real time equipment  The ultrasound examination was performed   using abdominal technique  The patient has a BMI of 30 9  Her blood   pressure today was 122/52  IVF pregnancy using 21year-old egg and her 's sperm  The embryo   transfer occurred on  using a 6-7 day frozen embryo which gave in due   date of  17      Problem list   1  History of a  section for a failed induction after a 36 week 3   day PPROM  In that pregnancy she had polyhydramnios and delivered a 6 lbs  9 oz  baby at birth  She declined [place] in this pregnancy as her baby did   well and went home with her  She would like a repeat   2  Advanced maternal age of 42 - Donor IVF age 20    1  Marginal placental cord insertion   4  2 vessel cord- Fetal Echo was normal      Cardiac motion was observed at 134 bpm       INDICATIONS      advanced maternal age      Exam Types      Amniotic Fluid Index   NST      RESULTS      Fetus # 1 of 1   Vertex presentation   Placenta Location = Anterior   No placenta previa   Placenta Grade = I      The NST was reactive with no decelerations  AMNIOTIC FLUID      Q1: 4 9      Q2: 3 9      Q3: 7 1      Q4: 5 0   DARY Total = 20 9 cm   Amniotic Fluid: Normal      BIOPHYSICAL PROFILE      The Biophysical Profile score was 10/10     Breathin  Movement: 2  Tone: 2  AFV: 2  NST: 2   The NST was reactive with no decelerations  IMPRESSION      Kelsey IUP   36 weeks and 0 days by IV Fertilization  (DENNIS=MAR 1 2017)   Vertex presentation   Regular fetal heart rate of 134 bpm   Anterior placenta   No placenta previa      RECOMMENDATION      DARY: 1 Week   NST: 1 Week      CARI De Leon , CHAO Landis     Maternal-Fetal Medicine   Electronically signed 02/01/17 10:44

## 2018-01-14 VITALS
WEIGHT: 168.8 LBS | HEIGHT: 62 IN | BODY MASS INDEX: 31.06 KG/M2 | DIASTOLIC BLOOD PRESSURE: 62 MMHG | SYSTOLIC BLOOD PRESSURE: 112 MMHG

## 2018-01-14 VITALS
SYSTOLIC BLOOD PRESSURE: 110 MMHG | DIASTOLIC BLOOD PRESSURE: 72 MMHG | HEIGHT: 62 IN | BODY MASS INDEX: 30.73 KG/M2 | WEIGHT: 167 LBS

## 2018-01-14 VITALS
HEIGHT: 62 IN | SYSTOLIC BLOOD PRESSURE: 118 MMHG | DIASTOLIC BLOOD PRESSURE: 76 MMHG | WEIGHT: 170 LBS | BODY MASS INDEX: 31.28 KG/M2

## 2018-01-14 NOTE — PROGRESS NOTES
2017         RE: Fady Gupta                                   To: [facility]   MR#: [id number]                                   [address] [place] [address]   : [address] [place] [address] Street: [phone]                             Fax: [phone]   (Exam #: [id number])      The LMP of this 42year old,  G2, P0-1-0-1 patient was unknown, her   working DENNIS is MAR 1 2017 and the current gestational age is 42 weeks 0   days by IV Fertilization  A sonographic examination was performed on 2017 using real time equipment  The ultrasound examination was performed   using abdominal technique  The patient has a BMI of 30 9  Her blood   pressure today was 125/66  IVF pregnancy using 21year-old egg and her 's sperm  The embryo   transfer occurred on  using a 6-7 day frozen embryo which gave in due   date of  17      Problem list   1  History of a  section for a failed induction after a 36 week 3   day PPROM  In that pregnancy she had polyhydramnios and delivered a 6 lbs  9 oz  baby at birth  She declined [place] in this pregnancy as her baby did   well and went home with her  She would like a repeat   2  Advanced maternal age of 42 - Donor IVF age 20  Had normal   preimplantation genetics completed  3  Marginal placental cord insertion   4  2 vessel cord- Fetal Echo was normal   5  Polyhydramnios was noted at 37 weeks  Cardiac motion was observed at 144 bpm       INDICATIONS      advanced maternal age      Exam Types      Amniotic Fluid Index      RESULTS      Fetus # 1 of 1   Breech presentation   Placenta Location = Anterior   No placenta previa   Placenta Grade = II      The NST was reactive with no decelerations  AMNIOTIC FLUID      Q1: 5 6      Q2: 8 4      Q3: 8 7      Q4: 3 6   DARY Total = 26 2 cm   Amniotic Fluid: POLYHYDRAMNIOS      BIOPHYSICAL PROFILE      The Biophysical Profile score was 10/10  Breathin  Movement: 2  Tone: 2  AFV: 2  NST: 2   The NST was reactive with no decelerations  IMPRESSION      Kelsey IUP   37 weeks and 0 days by IV Fertilization  (DENNIS=MAR 1 2017)   Breech presentation   Regular fetal heart rate of 144 bpm   Polyhydramnios   Anterior placenta   No placenta previa      GENERAL COMMENT       Jana Gaucher has a new finding today of polyhydramnios which was found in her   prior pregnancy at approximately 34 weeks and resolved over time  Her   Glucola this pregnancy was 106 which is normal  She is set up for her    on 17  Recommend weekly nonstress test snd fluid scans   for both indications of AMA and polyhydramnios  CARI Alexander M D     Maternal-Fetal Medicine   Electronically signed 17 04:33

## 2018-01-14 NOTE — PROGRESS NOTES
2017         RE: Ashu Flower                                   To: [facility]   MR#: [id number]                                   [facility]   : [address] [place] [address] Street: [phone]                             Fax: [phone]   (Exam #: [id number])      The LMP of this 42year old,  G2, P0-1-0-1 patient was unknown, her   working DENNIS is MAR 1 2017 and the current gestational age is 34 weeks 5   days by IV Fertilization  A sonographic examination was performed on 2017 using real time equipment  The ultrasound examination was   performed using abdominal technique  The patient has a BMI of 30 2  Her   blood pressure today was 108/55  IVF pregnancy using 21year-old egg and her 's sperm  The embryo   transfer occurred on  using a 6-7 day frozen embryo which gave in due   date of  17      Problem list   1  History of a  section for a failed induction after a 36 week 3   day PPROM  In that pregnancy she had polyhydramnios and delivered a 6 lbs  9 oz  baby at birth  She declined [place] in this pregnancy as her baby did   well and went home with her  She would like a repeat      2  Advanced maternal age of 42 - Donor IVF age 20    1  Marginal placental cord insertion   4  2 vessel cord- Fetal Echo was normal      Cardiac motion was observed at 142 bpm       INDICATIONS      advanced maternal age   previous  (s)   prior PPROM   previous  delivery   in vitro fertilization   single umbilical artery      Exam Types      Level I      RESULTS      Fetus # 1 of 1   Vertex presentation   Fetal growth appeared normal   Placenta Location = Anterior   No placenta previa   Placenta Grade = II      MEASUREMENTS (* Included In Average GA)      AC              33 3 cm        37 weeks 4 days* (95%)   BPD              8 6 cm        34 weeks 4 days* (45%)   HC              31 5 cm        34 weeks 6 days* (39%)   Femur            6 5 cm        33 weeks 1 day * (31%)      HC/AC           0 95   FL/AC           0 20   FL/BPD          0 76   EFW (Ac/Fl/Hc)  2774 grams - 6 lbs 2 oz                 (62%)      THE AVERAGE GESTATIONAL AGE is 35 weeks 1 day +/- 21 days  AMNIOTIC FLUID      Q1: 4 0      Q2: 5 1      Q3: 5 8      Q4: 3 5   DARY Total = 18 4 cm   Amniotic Fluid: Normal      ANATOMY DETAILS      Visualized Appearing Sonographically Normal:   HEAD: (Calvarium, BPD Level, Cavum, Lateral Ventricles, Cerebellum);      TH  CAV : (Diaphragm); HEART: (Four Chamber View, Proximal Left   Outflow, Proximal Right Outflow, 3VV, Short [place] of Greater Vessels,   Interventricular Septum, Interatrial Septum, Cardiac Axis, Cardiac   Position); RIGHT KIDNEY, LEFT KIDNEY, BLADDER, PLACENTA      IMPRESSION      Kelsey IUP   35 weeks and 1 day by this ultrasound  (DENNIS=2017)   34 weeks and 5 days by IV Fertilization  (DENNIS=MAR 1 2017)   Vertex presentation   Fetal growth appeared normal   Regular fetal heart rate of 142 bpm   Anterior placenta   No placenta previa      GENERAL COMMENT      On exam today the patient appears well, in no acute distress, and denies   any complaints other than lower extremity swelling  Her abdomen is   non-tender  There has been appropriate interval fetal growth  Good fetal movement and   tone are seen  The amniotic fluid volume appears normal   The placenta is   anterior and it appears sonographically normal   The patient was informed   of today's findings and all of her questions were answered  The   limitations of ultrasound were reviewed with the patient   labor   precautions and fetal kick counts were reviewed  We discussed   preeclampsia precautions as well  Mary Rojas has had some lower extremity   swelling  She has no other symptoms or signs of preeclampsia  We   discussed utilizing compression stockings in order to help reduce swelling        We discussed appropriate follow-up in detail and I recommend Lawrencejunior Arreguin return   in 2 weeks to initiate weekly  surveillance for the indication of   advanced maternal age greater than 36 which is an independent risk factor   for adverse pregnancy outcomes including stillbirth  Thank you very much for allowing us to participate in the care of this   very nice patient  Should you have any questions, please do not hesitate   to contact our office  Please note, in addition to the time spent discussing the results of the   ultrasound, I spent approximately 10 minutes of face-to-face time with the   patient, greater than 50% of which was spent in counseling and the   coordination of care for this patient  CARI Dodson M D     Electronically signed 17 10:27

## 2018-01-15 NOTE — PROGRESS NOTES
2016         RE: Kevin Silva                                   To: [facility]   MR#: [id number]                                   [address]   : [address]: [id number]                             Fax: [phone]   (Exam #: [id number])      The LMP of this 42year old,  G2, P0-1-0-1 patient was unknown, her   working DENNIS is MAR 1 2017 and the current gestational age is 23 weeks 0   days by IV Fertilization  A sonographic examination was performed on 2016 using real time equipment  The ultrasound examination was performed   using abdominal technique  The patient has a BMI of 28 2  Her blood   pressure today was 116/52  IVF pregnancy using 21year-old egg and her 's sperm  The embryo   transfer occurred on  using a 6-7 day frozen embryo which gave in due   date of  17      Problem list   1  History of a  section for a failed induction after a 36 week 3   day PPROM  In that pregnancy she had polyhydramnios and delivered a 6 lbs  9 oz  baby at birth  She declined [place] in this pregnancy as her baby did   well and went home with her  She would like a repeat   2  Advanced maternal age of 42 but the pregnancy was conceived using   21year-old aches   3   Marginal placental cord insertion   4  2 vessel cord- NIPT was normal      Cardiac motion was observed at 144 bpm       INDICATIONS      advanced maternal age   previous  (s)   prior PPROM   previous  delivery   in vitro fertilization   single umbilical artery      Exam Types      Fetal Echocardiogram      RESULTS      Fetus # 1 of 1   Vertex presentation   Placenta Location = Anterior   Placenta Grade = I      AMNIOTIC FLUID         Largest Vertical Pocket = 5 6 cm   Amniotic Fluid: Normal      FETAL VESSELS                                     S/D   PI    RI    PSV   AEDV RF cm/s       Umbilical Artery           2 50  0 84  0 60       Middle Cerebral Artery     4 60  1 57  0 78      ANATOMY DETAILS      Visualized Appearing Sonographically Normal:   HEART: (Four Chamber View, Proximal Left Outflow, Proximal Right Outflow,   3VV, 3 Vessel Trachea, Short Axis of Greater Vessels, Ductal Arch, Aortic   Arch, Interventricular Septum, Interatrial Septum, IVC, SVC, Cardiac Axis,   Cardiac Position);    STOMACH, BLADDER, PLACENTA      FETAL ECHOCARDIOGRAPHY      Visceral/abdominal situs                Normal   4 chamber view apical                   Normal   4 chamber view subcostal                Normal   Atrial septum                           Normal   Ventricular septum                      Normal   LVOT                                    Normal   RVOT                                    Normal   3 Vessel-trachea view                   Normal   3 Vessel view                           Normal   Short Axis ventricles                   Normal   Short axis outflows                     Normal   Aortic Arch                             Normal   Ductal Arch                             Normal   Superior vena cava                      Normal   Inferior vena cava                      Normal   Pulmonary veins                         Normal   Foramen ovale                           Normal   Mitral valve                            Normal   Tricuspid valve                         Normal   Aortic valve                            Normal   Pulmonary valve                         Normal   M-mode/rhythm                           Normal   Umbilical artery                        Normal   Ductus venosus                          Normal   Umbilical vein                          Normal      FETAL ECHO      The 4 chamber view appeared normal  Right and left ventricular and atrial   sizes were concordant  The aorta arises in a normal anatomic relationship   from the left ventricle   The pulmonary artery arises in a normal anatomic   relationship from the right ventricle  The short axis appeared normal    The distal pulmonary artery to ductal arch appeared normal  The distal   aorta to aortic arch appeared normal  The interventricular septum appeared   normal by 2D echo and color doppler  The interatrial septum appeared   normal by 2D echo and color doppler  The inferior vena cava appeared   normal  The superior vena cava appeared normal  The cardiac axis appeared   normal  The cardiac position appeared normal The  atrial to ventricular   ratio was one to one with a rate within the normal range of 110-160 beats   per minute  The views of at least 2 pulmonary veins and the 3 vessel view   appear normal  The flow across the fossa ovale is from right to left which   is normal  Pulsed Doppler demonstrated normal waveform features and the   flow velocities across the mitral 50 cm/sec , tricuspid 49 cm/sec, aortic   67 cm/sec, aortic arch  70 cm/sec, pulmonary 56 cm/sec and ductus   arteriosus 75 cm/sec appear normal  The cardiothoracic circumference ratio   appears normal at 50 % or less  Color doppler study demonstrated   unidirectional flow across the tricuspid and mitral valves with no   evidence of regurgitation  There is no evidence of any pericardial   effusion, pleural effusion, ascites or fetal arrhythmia  The myocardial   performance was adequate  Umbilical dopplers and MCA Dopplers are normal   for gestational age  The ability to view cardiac structures is dependant upon the image   quality, which varies with fetal position, gestational age and maternal   habitus  For example,  ultrasound may not detect small septal   defects and minor valvular abnormalities  Other examples of difficult    diagnosis include post valvular stenosis, coarctation of the   aorta, persistent patency of the ductus arteriosus and fossa ovale and   anomalous pulmonary venous return        IMPRESSION      Kelsey IUP   23 weeks and 0 days by IV Fertilization  (DENNIS=MAR 1 2017)   Vertex presentation   Regular fetal heart rate of 144 bpm   Normal Fetal Echocardiography   Anterior placenta      RECOMMENDATION      Ultrasound: at 28 weeks      CAIR Teran M D     Maternal-Fetal Medicine   Electronically signed 11/03/16 02:37

## 2018-01-18 NOTE — PROGRESS NOTES
DEC 12 2016         RE: Sammy Ray                                   To: [facility]   MR#: [id number]                                   [address]   : [address]: [id number]                             Fax: [phone]   (Exam #: [id number])      The LMP of this 42year old,  G2, P0-1-0-1 patient was unknown, her   working DENNIS is MAR 1 2017 and the current gestational age is 28 weeks 5   days by IV Fertilization  A sonographic examination was performed on DEC   12 2016 using real time equipment  The ultrasound examination was   performed using abdominal technique  The patient has a BMI of 29 3  Her   blood pressure today was 108/52  IVF pregnancy using 21year-old egg and her 's sperm  The embryo   transfer occurred on  using a 6-7 day frozen embryo which gave in due   date of  17      Problem list   1  History of a  section for a failed induction after a 36 week 3   day PPROM  In that pregnancy she had polyhydramnios and delivered a 6 lbs  9 oz  baby at birth  She declined [place] in this pregnancy as her baby did   well and went home with her  She would like a repeat   2  Advanced maternal age of 42 but the pregnancy was conceived using   21year-old aches   3   Marginal placental cord insertion   4  2 vessel cord- NIPT was normal      Cardiac motion was observed at 132 bpm       INDICATIONS      advanced maternal age   previous  (s)   prior PPROM   previous  delivery   in vitro fertilization   single umbilical artery      Exam Types      Level I      RESULTS      Fetus # 1 of 1   Breech presentation   Fetal growth appeared normal   Placenta Location = Anterior   No placenta previa   Placenta Grade = II      MEASUREMENTS (* Included In Average GA)      AC              26 3 cm        30 weeks 3 days* (75%)   BPD              7 2 cm        28 weeks 6 days* (46%)   HC 26 7 cm        28 weeks 6 days* (38%)   Femur            5 2 cm        27 weeks 6 days* (24%)      Cerebellum       3 3 cm        29 weeks 4 days      HC/AC           1 02   FL/AC           0 20   FL/BPD          0 72   EFW (Ac/Fl/Hc)  1375 grams - 3 lbs 0 oz                 (56%)      THE AVERAGE GESTATIONAL AGE is 29 weeks 0 days +/- 18 days  AMNIOTIC FLUID      Q1: 5 1      Q2: 7 3      Q3: 5 2      Q4: 4 2   DARY Total = 21 7 cm   Amniotic Fluid: Normal      ANATOMY DETAILS      Visualized Appearing Sonographically Normal:   HEAD: (Calvarium, BPD Level, Cavum, Lateral Ventricles, Cerebellum,   Cisterna Magna);    TH  CAV : (Diaphragm); HEART: (Four Chamber View,   Proximal Left Outflow, Proximal Right Outflow, Interventricular Septum,   Interatrial Septum, Cardiac Axis, Cardiac Position);    STOMACH, RIGHT   KIDNEY, LEFT KIDNEY, BLADDER, PLACENTA      IMPRESSION      Kelsey IUP   29 weeks and 0 days by this ultrasound  (DENNIS=2017)   28 weeks and 5 days by IV Fertilization  (DENNIS=MAR 1 2017)   Breech presentation   Fetal growth appeared normal   Regular fetal heart rate of 132 bpm   Anterior placenta   No placenta previa      GENERAL COMMENT      OFFICE VISIT      On exam today the patient appears well, in no acute distress, and denies   any complaints  Her abdomen is non-tender  There has been appropriate interval fetal growth  Good fetal movement and   tone are seen  The amniotic fluid volume appears normal   The placenta is   anterior and it appears sonographically normal   The patient was informed   of today's findings and all of her questions were answered  The   limitations of ultrasound were reviewed with the patient   labor   precautions and fetal kick counts were reviewed  We discussed today's results in detail and appropriate follow-up    I   recommend the patient return in 6 weeks for a fetal growth evaluation and   to initiate  surveillance at around 39 weeks for the indication   of advanced maternal age greater than 36  Thank you very much for allowing us to participate in the care of this   very nice patient  Should you have any questions, please do not hesitate   to contact our office  Please note, in addition to the time spent discussing the results of the   ultrasound, I spent approximately 10 minutes of face-to-face time with the   patient, greater than 50% of which was spent in counseling and the   coordination of care for this patient  CARI Calderón M D     Electronically signed 12/12/16 10:46

## 2018-01-22 VITALS
WEIGHT: 160 LBS | HEIGHT: 62 IN | SYSTOLIC BLOOD PRESSURE: 106 MMHG | DIASTOLIC BLOOD PRESSURE: 62 MMHG | BODY MASS INDEX: 29.44 KG/M2

## 2018-01-22 VITALS
DIASTOLIC BLOOD PRESSURE: 70 MMHG | WEIGHT: 164 LBS | BODY MASS INDEX: 30.18 KG/M2 | HEIGHT: 62 IN | SYSTOLIC BLOOD PRESSURE: 130 MMHG

## 2018-01-22 VITALS
HEIGHT: 62 IN | DIASTOLIC BLOOD PRESSURE: 70 MMHG | WEIGHT: 146 LBS | SYSTOLIC BLOOD PRESSURE: 104 MMHG | BODY MASS INDEX: 26.87 KG/M2

## 2018-01-22 VITALS
HEIGHT: 62 IN | BODY MASS INDEX: 26.87 KG/M2 | SYSTOLIC BLOOD PRESSURE: 100 MMHG | WEIGHT: 146 LBS | DIASTOLIC BLOOD PRESSURE: 68 MMHG

## 2018-01-27 LAB
CHOLEST SERPL-MCNC: 141 MG/DL (ref 100–199)
CHOLEST/HDLC SERPL: 2.5 RATIO UNITS (ref 0–4.4)
HDLC SERPL-MCNC: 57 MG/DL
LDLC SERPL CALC-MCNC: 71 MG/DL (ref 0–99)
SL AMB VLDL CHOLESTEROL CALC: 13 MG/DL (ref 5–40)
T4 FREE SERPL-MCNC: 1.27 NG/DL (ref 0.82–1.77)
TRIGL SERPL-MCNC: 64 MG/DL (ref 0–149)
TSH SERPL DL<=0.005 MIU/L-ACNC: 2.81 UIU/ML (ref 0.45–4.5)

## 2018-03-07 NOTE — PROGRESS NOTES
[employer] Education 2nd Trimester:   Second Trimester Education provided:   benefits of breastfeeding, importance of exclusive breastfeeding, early initiation of breastfeeding, exclusive breastfeeding for the first 6 months, non-pharmacologic pain relief methods for labor, rooming-in on 24 hour basis, Pregnancy Essentials Reference Guide given and 28 week packet given  Mother has not registered for prenatal class  Thought has been given to selecting a pediatrician  The mother has discussed personal preferences with her provider     Prenatal education provided by: Abril Phelan      Signatures   Electronically signed by : Iwona Nazario MA; Dec 20 2016  9:21AM EST                       (Author)

## 2018-03-07 NOTE — PROGRESS NOTES
Education  [employer] Education 3rd Trimester: Third Trimester Education provided:   benefits of breastfeeding, importance of exclusive breastfeeding, early initiation of breastfeeding, exclusive breastfeeding for the first 6 months, frequent feedings for optimal milk production, feeding on demand/baby-led feedings, effective positioning and attachment, importance of breastfeeding after 6 months following introduction of other foods, non-pharmacologic pain relief methods for labor and importance of early skin-to-skin contact  rooming-in on 24 hour basis Pregnancy Essentials Reference Guide given   The patient is planning on breastfeeding  The patient is planning on exclusively breastfeeding until the baby is 10months of age  Mother has not registered for prenatal class  Thought has been given to selecting a pediatrician  The mother has not discussed personal preferences with her provider     Prenatal education provided by: Azul Whaley      Signatures   Electronically signed by : Teri Frederick DO; Jan 31 2017 12:18PM EST                       (Author)

## 2018-11-27 ENCOUNTER — ANNUAL EXAM (OUTPATIENT)
Dept: OBGYN CLINIC | Facility: CLINIC | Age: 43
End: 2018-11-27
Payer: COMMERCIAL

## 2018-11-27 VITALS
HEIGHT: 62 IN | DIASTOLIC BLOOD PRESSURE: 70 MMHG | BODY MASS INDEX: 25.4 KG/M2 | WEIGHT: 138 LBS | SYSTOLIC BLOOD PRESSURE: 108 MMHG

## 2018-11-27 DIAGNOSIS — Z12.39 BREAST CANCER SCREENING: ICD-10-CM

## 2018-11-27 DIAGNOSIS — Z01.419 ENCOUNTER FOR ANNUAL ROUTINE GYNECOLOGICAL EXAMINATION: Primary | ICD-10-CM

## 2018-11-27 PROCEDURE — 99396 PREV VISIT EST AGE 40-64: CPT | Performed by: OBSTETRICS & GYNECOLOGY

## 2018-11-27 NOTE — PROGRESS NOTES
Assessment/Plan:    Pap smear done as well as annual   Encouraged self-breast examination as well as calcium supplementation  Recommend baseline mammogram   Reviewed adriana menopausal symptoms  She will keep a menstrual diary and notify me if her cycles are more frequent  Return to office in 1 year  No problem-specific Assessment & Plan notes found for this encounter  Diagnoses and all orders for this visit:    Encounter for annual routine gynecological examination    Breast cancer screening  -     Mammo screening bilateral w 3d & cad; Future          Subjective:      Patient ID: Ryann Osorio is a 39 y o  female  HPI     This is a 50-year-old female  ( x2, age 2, 1) presents for her annual gyn exam   Her menstrual cycles have been every 28 days lasting 4-5 days  Over the last 6 months she has had a couple of cycles that were 26 day, 21 day  She has been under a bit of stress  They have recently moved in the last week  She denies any changes in bowel or bladder function  She does state that she is nursing however feels that she is not lactating, every evening  Patient did have 1 mammogram done at age 35  Implications of breast feeding and mammography reviewed  All questions answered  The following portions of the patient's history were reviewed and updated as appropriate: allergies, current medications, past family history, past medical history, past social history, past surgical history and problem list     Review of Systems   Constitutional: Negative for fatigue, fever and unexpected weight change  Respiratory: Negative for cough, chest tightness, shortness of breath and wheezing  Cardiovascular: Negative  Negative for chest pain and palpitations  Gastrointestinal: Negative  Negative for abdominal distention, abdominal pain, blood in stool, constipation, diarrhea, nausea and vomiting  Genitourinary: Negative    Negative for difficulty urinating, dyspareunia, dysuria, flank pain, frequency, genital sores, hematuria, pelvic pain, urgency, vaginal bleeding, vaginal discharge and vaginal pain  Skin: Negative for rash  Objective:      /70   Ht 5' 2" (1 575 m)   Wt 62 6 kg (138 lb)   LMP 11/07/2018 (Exact Date)   Breastfeeding? Yes   BMI 25 24 kg/m²          Physical Exam   Constitutional: She appears well-developed and well-nourished  Cardiovascular: Normal rate and regular rhythm  Pulmonary/Chest: Effort normal and breath sounds normal  Right breast exhibits no inverted nipple, no mass, no nipple discharge, no skin change and no tenderness  Left breast exhibits no inverted nipple, no mass, no nipple discharge, no skin change and no tenderness  Abdominal: Soft  Bowel sounds are normal  She exhibits no distension  There is no tenderness  There is no rebound and no guarding  Genitourinary: Vagina normal and uterus normal  There is no lesion on the right labia  There is no lesion on the left labia  Cervix exhibits no discharge and no friability  Right adnexum displays no mass, no tenderness and no fullness  Left adnexum displays no mass, no tenderness and no fullness  No vaginal discharge found

## 2018-11-29 LAB
CLINICAL INFO: NORMAL
CYTO CVX: NORMAL
CYTOLOGY CMNT CVX/VAG CYTO-IMP: NORMAL
DATE PREVIOUS BX: NORMAL
HPV E6+E7 MRNA CVX QL NAA+PROBE: NOT DETECTED
LMP START DATE: NORMAL
SL AMB PREV. PAP:: NORMAL
SPECIMEN SOURCE CVX/VAG CYTO: NORMAL

## 2019-11-06 ENCOUNTER — ANNUAL EXAM (OUTPATIENT)
Dept: OBGYN CLINIC | Facility: CLINIC | Age: 44
End: 2019-11-06
Payer: COMMERCIAL

## 2019-11-06 VITALS
DIASTOLIC BLOOD PRESSURE: 68 MMHG | HEIGHT: 62 IN | SYSTOLIC BLOOD PRESSURE: 120 MMHG | BODY MASS INDEX: 28.98 KG/M2 | WEIGHT: 157.5 LBS

## 2019-11-06 DIAGNOSIS — Z01.419 ENCOUNTER FOR ANNUAL ROUTINE GYNECOLOGICAL EXAMINATION: Primary | ICD-10-CM

## 2019-11-06 DIAGNOSIS — Z12.39 BREAST CANCER SCREENING: ICD-10-CM

## 2019-11-06 PROCEDURE — 99396 PREV VISIT EST AGE 40-64: CPT | Performed by: OBSTETRICS & GYNECOLOGY

## 2019-11-06 NOTE — PROGRESS NOTES
Assessment/Plan:  Pap smear deferred due to low risk status  Encouraged self-breast examination as well as calcium supplementation  Continue annual mammogram   Reviewed adriana menopausal symptoms  She will keep a menstrual diary, call if less than 21 day cycle  She will continue to follow-up with her primary care physician  She is scheduled to see a neurologist follow-up visual migraine headaches  Return to office in 1 year or p r n  No problem-specific Assessment & Plan notes found for this encounter  Diagnoses and all orders for this visit:    Encounter for annual routine gynecological examination    Breast cancer screening  -     Mammo screening bilateral w 3d & cad; Future          Subjective:      Patient ID: Joseph Willams is a 38 y o  female  HPI     This is a 58-year-old female  ( x2, age 10, 1) presents for annual gyn exam   Patient stopped breastfeeding 2019  Her cycles have changed from a 28 day cycle now to every 23-30 days lasting 4-5 days with no breakthrough bleeding  She denies any changes in bowel or bladder function  There has been no pelvic pain  Patient has been in a monogamous relationship for over 9 years  Her method of contraception is tubal ligation  Her Pap smears have been normal   Last Pap smear  within normal limits  Patient ultimately conceived through IVF with her pregnancies  The following portions of the patient's history were reviewed and updated as appropriate: allergies, current medications, past family history, past medical history, past social history, past surgical history and problem list     Review of Systems   Constitutional: Negative for fatigue, fever and unexpected weight change  Respiratory: Negative for cough, chest tightness, shortness of breath and wheezing  Cardiovascular: Negative  Negative for chest pain and palpitations  Gastrointestinal: Negative    Negative for abdominal distention, abdominal pain, blood in stool, constipation, diarrhea, nausea and vomiting  Genitourinary: Negative  Negative for difficulty urinating, dyspareunia, dysuria, flank pain, frequency, genital sores, hematuria, pelvic pain, urgency, vaginal bleeding, vaginal discharge and vaginal pain  Skin: Negative for rash  Objective:      /68   Ht 5' 2" (1 575 m)   Wt 71 4 kg (157 lb 8 oz)   LMP 10/27/2019 (Exact Date)   Breastfeeding? No   BMI 28 81 kg/m²          Physical Exam   Constitutional: She appears well-developed and well-nourished  Cardiovascular: Normal rate and regular rhythm  Pulmonary/Chest: Effort normal and breath sounds normal  Right breast exhibits no inverted nipple, no mass, no nipple discharge, no skin change and no tenderness  Left breast exhibits no inverted nipple, no mass, no nipple discharge, no skin change and no tenderness  Abdominal: Soft  Bowel sounds are normal  She exhibits no distension  There is no tenderness  There is no rebound and no guarding  Genitourinary: Vagina normal and uterus normal  There is no lesion on the right labia  There is no lesion on the left labia  Cervix exhibits no discharge and no friability  Right adnexum displays no mass, no tenderness and no fullness  Left adnexum displays no mass, no tenderness and no fullness  No vaginal discharge found

## 2020-11-19 DIAGNOSIS — Z12.39 BREAST CANCER SCREENING: ICD-10-CM

## 2020-12-03 ENCOUNTER — ANNUAL EXAM (OUTPATIENT)
Dept: OBGYN CLINIC | Facility: CLINIC | Age: 45
End: 2020-12-03
Payer: COMMERCIAL

## 2020-12-03 VITALS
WEIGHT: 149 LBS | HEIGHT: 62 IN | SYSTOLIC BLOOD PRESSURE: 128 MMHG | BODY MASS INDEX: 27.42 KG/M2 | DIASTOLIC BLOOD PRESSURE: 64 MMHG

## 2020-12-03 DIAGNOSIS — Z01.419 ENCOUNTER FOR ANNUAL ROUTINE GYNECOLOGICAL EXAMINATION: Primary | ICD-10-CM

## 2020-12-03 DIAGNOSIS — R10.2 PELVIC PAIN: ICD-10-CM

## 2020-12-03 DIAGNOSIS — Z12.39 ENCOUNTER FOR SCREENING FOR MALIGNANT NEOPLASM OF BREAST, UNSPECIFIED SCREENING MODALITY: ICD-10-CM

## 2020-12-03 DIAGNOSIS — N92.6 IRREGULAR MENSES: ICD-10-CM

## 2020-12-03 PROCEDURE — 99396 PREV VISIT EST AGE 40-64: CPT | Performed by: OBSTETRICS & GYNECOLOGY

## 2020-12-07 LAB
CLINICAL INFO: NORMAL
CYTO CVX: NORMAL
DATE PREVIOUS BX: NORMAL
LMP START DATE: NORMAL
SL AMB PREV. PAP:: NORMAL
SPECIMEN SOURCE CVX/VAG CYTO: NORMAL

## 2021-04-08 DIAGNOSIS — Z23 ENCOUNTER FOR IMMUNIZATION: ICD-10-CM

## 2021-04-19 ENCOUNTER — IMMUNIZATIONS (OUTPATIENT)
Dept: FAMILY MEDICINE CLINIC | Facility: HOSPITAL | Age: 46
End: 2021-04-19

## 2021-04-19 DIAGNOSIS — Z23 ENCOUNTER FOR IMMUNIZATION: Primary | ICD-10-CM

## 2021-04-19 PROCEDURE — 91300 SARS-COV-2 / COVID-19 MRNA VACCINE (PFIZER-BIONTECH) 30 MCG: CPT

## 2021-04-19 PROCEDURE — 0001A SARS-COV-2 / COVID-19 MRNA VACCINE (PFIZER-BIONTECH) 30 MCG: CPT

## 2021-05-14 ENCOUNTER — IMMUNIZATIONS (OUTPATIENT)
Dept: FAMILY MEDICINE CLINIC | Facility: HOSPITAL | Age: 46
End: 2021-05-14

## 2021-05-14 DIAGNOSIS — Z23 ENCOUNTER FOR IMMUNIZATION: Primary | ICD-10-CM

## 2021-05-14 PROCEDURE — 0002A SARS-COV-2 / COVID-19 MRNA VACCINE (PFIZER-BIONTECH) 30 MCG: CPT

## 2021-05-14 PROCEDURE — 91300 SARS-COV-2 / COVID-19 MRNA VACCINE (PFIZER-BIONTECH) 30 MCG: CPT

## 2021-07-25 ENCOUNTER — HOSPITAL ENCOUNTER (OUTPATIENT)
Dept: RADIOLOGY | Facility: HOSPITAL | Age: 46
Discharge: HOME | End: 2021-07-25
Attending: OBSTETRICS & GYNECOLOGY
Payer: COMMERCIAL

## 2021-07-25 DIAGNOSIS — R10.2 PELVIC PAIN: ICD-10-CM

## 2021-07-25 PROCEDURE — 76856 US EXAM PELVIC COMPLETE: CPT

## 2021-07-25 PROCEDURE — 76830 TRANSVAGINAL US NON-OB: CPT

## 2021-07-27 ENCOUNTER — TELEPHONE (OUTPATIENT)
Dept: OBGYN CLINIC | Facility: CLINIC | Age: 46
End: 2021-07-27

## 2021-07-27 NOTE — TELEPHONE ENCOUNTER
Pt informed pelvic U/S results 7/25/2021 - wnl  Pt is still have same intermittent "fullness" feeling along C/S incisional area  She is inquiring about follow up? Yearly due here 12/2021

## 2021-07-27 NOTE — TELEPHONE ENCOUNTER
----- Message from Fernandez Brennan DO sent at 7/27/2021  7:43 AM EDT -----   Inform patient pelvic ultrasound normal, normal ovaries x2

## 2021-07-27 NOTE — TELEPHONE ENCOUNTER
Pt informed to f/u with general surgeon for further evaluation - referred to Dr Edna Garcia - address & ph# provided

## 2021-11-26 DIAGNOSIS — N64.89 BREAST ASYMMETRY: Primary | ICD-10-CM

## 2021-12-01 ENCOUNTER — CONSULT (OUTPATIENT)
Dept: SURGERY | Facility: CLINIC | Age: 46
End: 2021-12-01
Payer: COMMERCIAL

## 2021-12-01 VITALS — BODY MASS INDEX: 27.38 KG/M2 | WEIGHT: 145 LBS | HEIGHT: 61 IN

## 2021-12-01 DIAGNOSIS — K43.2 INCISIONAL HERNIA, WITHOUT OBSTRUCTION OR GANGRENE: Primary | ICD-10-CM

## 2021-12-01 PROCEDURE — 99203 OFFICE O/P NEW LOW 30 MIN: CPT | Performed by: SURGERY

## 2021-12-06 ENCOUNTER — IMMUNIZATIONS (OUTPATIENT)
Dept: FAMILY MEDICINE CLINIC | Facility: HOSPITAL | Age: 46
End: 2021-12-06

## 2021-12-06 DIAGNOSIS — Z23 ENCOUNTER FOR IMMUNIZATION: Primary | ICD-10-CM

## 2021-12-06 PROCEDURE — 91300 COVID-19 PFIZER VACC 0.3 ML: CPT

## 2021-12-06 PROCEDURE — 0001A COVID-19 PFIZER VACC 0.3 ML: CPT

## 2022-01-04 ENCOUNTER — ANNUAL EXAM (OUTPATIENT)
Dept: OBGYN CLINIC | Facility: CLINIC | Age: 47
End: 2022-01-04
Payer: COMMERCIAL

## 2022-01-04 VITALS
SYSTOLIC BLOOD PRESSURE: 104 MMHG | BODY MASS INDEX: 27.94 KG/M2 | DIASTOLIC BLOOD PRESSURE: 68 MMHG | HEIGHT: 61 IN | WEIGHT: 148 LBS

## 2022-01-04 DIAGNOSIS — Z12.39 ENCOUNTER FOR SCREENING FOR MALIGNANT NEOPLASM OF BREAST, UNSPECIFIED SCREENING MODALITY: ICD-10-CM

## 2022-01-04 DIAGNOSIS — R92.2 DENSE BREAST TISSUE ON MAMMOGRAM: ICD-10-CM

## 2022-01-04 DIAGNOSIS — N60.11 FIBROCYSTIC BREAST CHANGES OF BOTH BREASTS: ICD-10-CM

## 2022-01-04 DIAGNOSIS — Z01.419 ENCOUNTER FOR ANNUAL ROUTINE GYNECOLOGICAL EXAMINATION: Primary | ICD-10-CM

## 2022-01-04 DIAGNOSIS — N60.12 FIBROCYSTIC BREAST CHANGES OF BOTH BREASTS: ICD-10-CM

## 2022-01-04 PROCEDURE — 99396 PREV VISIT EST AGE 40-64: CPT | Performed by: OBSTETRICS & GYNECOLOGY

## 2022-01-04 NOTE — PROGRESS NOTES
Assessment/Plan:  Pap smear deferred due to low risk status  Encouraged self-breast examination as well as calcium supplementation  Continue annual mammogram   Reviewed automated breast ultrasound given breast density  She will check see if this is covered by her insurance  Reviewed perimenopause  She will keep menstrual diary  Return to office in 1 year or p r n  No problem-specific Assessment & Plan notes found for this encounter  Diagnoses and all orders for this visit:    Encounter for annual routine gynecological examination    Encounter for screening for malignant neoplasm of breast, unspecified screening modality  -     Mammo screening bilateral w 3d & cad; Future  -     US breast screening bilateral complete (ABUS); Future    Fibrocystic breast changes of both breasts  -     Mammo screening bilateral w 3d & cad; Future  -     US breast screening bilateral complete (ABUS); Future    Dense breast tissue on mammogram  -     US breast screening bilateral complete (ABUS); Future          Subjective:      Patient ID: Mohini Bills is a 51 y o  female  HPI     This is a very pleasant 77-year-old female  ( x2, age 8, 10) presents for her gyn exam   She states in the course of the year her menstrual cycles have now become more regular approximately every 25-31 days lasting 5 days with no breakthrough bleeding  She denies any changes in bowel or bladder function  She has been in a monogamous relationship with her  for over 11 years  Method of contraception is tubal ligation  Pap smears have been normal   Last Pap   She was evaluated by General surgery in the course of the year for a small ventral hernia, on palpable on exam however ultrasound 7 millimeters  Given patient asymptomatic she was just going to remain conservative  She denies any abdominal pelvic pain        The following portions of the patient's history were reviewed and updated as appropriate: allergies, current medications, past family history, past medical history, past social history, past surgical history and problem list     Review of Systems   Constitutional: Negative for fatigue, fever and unexpected weight change  Respiratory: Negative for cough, chest tightness, shortness of breath and wheezing  Cardiovascular: Negative  Negative for chest pain and palpitations  Gastrointestinal: Negative  Negative for abdominal distention, abdominal pain, blood in stool, constipation, diarrhea, nausea and vomiting  Genitourinary: Negative  Negative for difficulty urinating, dyspareunia, dysuria, flank pain, frequency, genital sores, hematuria, pelvic pain, urgency, vaginal bleeding, vaginal discharge and vaginal pain  Skin: Negative for rash  Objective:      /68   Ht 5' 1" (1 549 m)   Wt 67 1 kg (148 lb)   LMP 12/29/2021   BMI 27 96 kg/m²          Physical Exam  Constitutional:       Appearance: Normal appearance  She is well-developed  Cardiovascular:      Rate and Rhythm: Normal rate and regular rhythm  Pulmonary:      Effort: Pulmonary effort is normal       Breath sounds: Normal breath sounds  Chest:   Breasts:      Right: No inverted nipple, mass, nipple discharge, skin change or tenderness  Left: No inverted nipple, mass, nipple discharge, skin change or tenderness  Abdominal:      General: Bowel sounds are normal  There is no distension  Palpations: Abdomen is soft  Tenderness: There is no abdominal tenderness  There is no guarding or rebound  Genitourinary:     Labia:         Right: No rash, tenderness or lesion  Left: No rash, tenderness or lesion  Vagina: Normal  No signs of injury  No vaginal discharge or tenderness  Cervix: No cervical motion tenderness, discharge, friability, lesion, erythema or cervical bleeding  Uterus: Not enlarged and not tender  Adnexa:         Right: No mass, tenderness or fullness            Left: No mass, tenderness or fullness  Neurological:      Mental Status: She is alert and oriented to person, place, and time     Psychiatric:         Behavior: Behavior normal

## 2022-12-13 DIAGNOSIS — N60.12 FIBROCYSTIC BREAST CHANGES OF BOTH BREASTS: ICD-10-CM

## 2022-12-13 DIAGNOSIS — N60.11 FIBROCYSTIC BREAST CHANGES OF BOTH BREASTS: ICD-10-CM

## 2022-12-13 DIAGNOSIS — Z12.39 ENCOUNTER FOR SCREENING FOR MALIGNANT NEOPLASM OF BREAST, UNSPECIFIED SCREENING MODALITY: ICD-10-CM

## 2023-01-09 ENCOUNTER — ANNUAL EXAM (OUTPATIENT)
Dept: OBGYN CLINIC | Facility: CLINIC | Age: 48
End: 2023-01-09

## 2023-01-09 VITALS
HEIGHT: 61 IN | BODY MASS INDEX: 29.45 KG/M2 | WEIGHT: 156 LBS | SYSTOLIC BLOOD PRESSURE: 118 MMHG | DIASTOLIC BLOOD PRESSURE: 80 MMHG

## 2023-01-09 DIAGNOSIS — N60.11 FIBROCYSTIC BREAST CHANGES OF BOTH BREASTS: ICD-10-CM

## 2023-01-09 DIAGNOSIS — N60.12 FIBROCYSTIC BREAST CHANGES OF BOTH BREASTS: ICD-10-CM

## 2023-01-09 DIAGNOSIS — Z01.419 ENCOUNTER FOR ANNUAL ROUTINE GYNECOLOGICAL EXAMINATION: Primary | ICD-10-CM

## 2023-01-09 DIAGNOSIS — Z80.3 FAMILY HISTORY OF BREAST CANCER: ICD-10-CM

## 2023-01-09 DIAGNOSIS — Z12.39 ENCOUNTER FOR SCREENING FOR MALIGNANT NEOPLASM OF BREAST, UNSPECIFIED SCREENING MODALITY: ICD-10-CM

## 2023-01-09 NOTE — PROGRESS NOTES
Assessment/Plan:  Pap smear done as well as annual   Encourage self breast examination as well as calcium supplementation  Continue annual mammogram   Reviewed automated breast ultrasound given increased risk of breast cancer  She will check to see if this is covered by her insurance  Reviewed perimenopausal symptoms  She will keep a menstrual diary  She will continue to follow-up with primary care as scheduled  Return to office in 1 year or as needed  No problem-specific Assessment & Plan notes found for this encounter  Diagnoses and all orders for this visit:    Encounter for annual routine gynecological examination  -     Liquid-based pap, screening    Encounter for screening for malignant neoplasm of breast, unspecified screening modality  -     Mammo screening bilateral w 3d & cad; Future    Family history of breast cancer  -     US breast screening bilateral complete (ABUS); Future    Fibrocystic breast changes of both breasts  -     US breast screening bilateral complete (ABUS); Future          Subjective:      Patient ID: Pastor Nguyen is a 49 y o  female  HPI     This is a pleasant 41-year-old female G2, P2 ( x2, age 5, 9) presents for her GYN exam   In the course of the year her cycles have become more irregular where she has skipped 3 consecutive months  She denies any hot flashes or night sweats  There is been no changes in bowel or bladder function  She has been in a monogamous relationship with her  for over 12 years  Method of contraception is tubal ligation  Pap smears have been normal     She underwent colonoscopy 2022 revealing isolated polyp with follow-up in 7 years  She initially saw colorectal due to stomach problems    She is now considering seeing a gastroenterologist     The following portions of the patient's history were reviewed and updated as appropriate: allergies, current medications, past family history, past medical history, past social history, past surgical history and problem list     Review of Systems   Constitutional: Negative for fatigue, fever and unexpected weight change  Respiratory: Negative for cough, chest tightness, shortness of breath and wheezing  Cardiovascular: Negative  Negative for chest pain and palpitations  Gastrointestinal: Negative  Negative for abdominal distention, abdominal pain, blood in stool, constipation, diarrhea, nausea and vomiting  Genitourinary: Negative  Negative for difficulty urinating, dyspareunia, dysuria, flank pain, frequency, genital sores, hematuria, pelvic pain, urgency, vaginal bleeding, vaginal discharge and vaginal pain  Skin: Negative for rash  Objective:      /80   Ht 5' 1" (1 549 m)   Wt 70 8 kg (156 lb)   LMP 12/07/2022   BMI 29 48 kg/m²          Physical Exam  Constitutional:       Appearance: Normal appearance  She is well-developed  Cardiovascular:      Rate and Rhythm: Normal rate and regular rhythm  Pulmonary:      Effort: Pulmonary effort is normal       Breath sounds: Normal breath sounds  Chest:   Breasts:     Right: No inverted nipple, mass, nipple discharge, skin change or tenderness  Left: No inverted nipple, mass, nipple discharge, skin change or tenderness  Abdominal:      General: Bowel sounds are normal  There is no distension  Palpations: Abdomen is soft  Tenderness: There is no abdominal tenderness  There is no guarding or rebound  Genitourinary:     Labia:         Right: No rash, tenderness or lesion  Left: No rash, tenderness or lesion  Vagina: Normal  No signs of injury  No vaginal discharge or tenderness  Cervix: No cervical motion tenderness, discharge, friability, lesion, erythema or cervical bleeding  Uterus: Not enlarged and not tender  Adnexa:         Right: No mass, tenderness or fullness  Left: No mass, tenderness or fullness       Neurological:      Mental Status: She is alert and oriented to person, place, and time     Psychiatric:         Behavior: Behavior normal

## 2023-01-17 LAB
LAB AP GYN PRIMARY INTERPRETATION: NORMAL
LAB AP LMP: NORMAL
Lab: NORMAL

## 2023-05-17 ENCOUNTER — HOSPITAL ENCOUNTER (OUTPATIENT)
Dept: ULTRASOUND IMAGING | Facility: CLINIC | Age: 48
Discharge: HOME | End: 2023-05-17

## 2023-05-17 VITALS — WEIGHT: 156 LBS | HEIGHT: 61 IN | BODY MASS INDEX: 29.45 KG/M2

## 2023-05-17 DIAGNOSIS — N60.12 FIBROCYSTIC BREAST CHANGES OF BOTH BREASTS: ICD-10-CM

## 2023-05-17 DIAGNOSIS — Z80.3 FAMILY HISTORY OF BREAST CANCER: ICD-10-CM

## 2023-05-17 DIAGNOSIS — N60.11 FIBROCYSTIC BREAST CHANGES OF BOTH BREASTS: ICD-10-CM

## 2024-05-15 ENCOUNTER — ANNUAL EXAM (OUTPATIENT)
Dept: OBGYN CLINIC | Facility: CLINIC | Age: 49
End: 2024-05-15
Payer: COMMERCIAL

## 2024-05-15 VITALS
SYSTOLIC BLOOD PRESSURE: 118 MMHG | WEIGHT: 158 LBS | DIASTOLIC BLOOD PRESSURE: 70 MMHG | BODY MASS INDEX: 29.83 KG/M2 | HEIGHT: 61 IN

## 2024-05-15 DIAGNOSIS — Z12.39 ENCOUNTER FOR SCREENING FOR MALIGNANT NEOPLASM OF BREAST, UNSPECIFIED SCREENING MODALITY: ICD-10-CM

## 2024-05-15 DIAGNOSIS — Z80.3 FAMILY HISTORY OF BREAST CANCER: ICD-10-CM

## 2024-05-15 DIAGNOSIS — N95.1 PERIMENOPAUSE: ICD-10-CM

## 2024-05-15 DIAGNOSIS — Z01.419 ENCOUNTER FOR ANNUAL ROUTINE GYNECOLOGICAL EXAMINATION: Primary | ICD-10-CM

## 2024-05-15 DIAGNOSIS — N60.12 FIBROCYSTIC BREAST CHANGES OF BOTH BREASTS: ICD-10-CM

## 2024-05-15 DIAGNOSIS — N60.11 FIBROCYSTIC BREAST CHANGES OF BOTH BREASTS: ICD-10-CM

## 2024-05-15 PROCEDURE — 99396 PREV VISIT EST AGE 40-64: CPT | Performed by: OBSTETRICS & GYNECOLOGY

## 2024-05-15 NOTE — PROGRESS NOTES
Ambulatory Visit  Name: Sarahi Colmenares      : 1975      MRN: [id number]  Encounter Provider: Kenia Roque DO  Encounter Date: 5/15/2024   Encounter department: [facility]    Assessment & Plan   1. Encounter for annual routine gynecological examination  2. Encounter for screening for malignant neoplasm of breast, unspecified screening modality  3. Family history of breast cancer  4. Fibrocystic breast changes of both breasts    Pap smear deferred due to low risk status.  Encouraged self breast examination as well as calcium supplementation.  Continue annual mammogram.  Reviewed automated breast ultrasound.  She will check to see if this is covered by her insurance.  Reviewed colon cancer screening, up-to-date.  Reviewed signs of perimenopause.  All questions answered.  She is scheduled to see her family physician with routine labs this summer.  Return to office in 1 year or as needed      History of Present Illness         Sarahi Colmenares is a 48 y.o. female P2 ( x 2, age 8, 6) presents for her GYN exam.  Her last menstrual cycle was 6 months ago.  She does recall having night sweats at that time which has resolved.  There is been no bleeding or spotting.  She does get intermittent breast tenderness.  There are no changes in bowel or bladder function.  She has been in a monogamous relationship for over 13 years.  Method of contraception is tubal ligation.  Pap smear normal .    Menarche age 10, history of infertility with donor egg, poor ovarian reserve.      Colon 2022 f/u 7 yrs    Mammo 2023    Pap             Review of Systems   Constitutional:  Negative for fatigue, fever and unexpected weight change.   Respiratory:  Negative for cough, chest tightness, shortness of breath and wheezing.    Cardiovascular: Negative.  Negative for chest pain and palpitations.   Gastrointestinal: Negative.  Negative for abdominal distention, abdominal pain, blood in stool, constipation,  "diarrhea, nausea and vomiting.   Genitourinary: Negative.  Negative for difficulty urinating, dyspareunia, dysuria, flank pain, frequency, genital sores, hematuria, pelvic pain, urgency, vaginal bleeding, vaginal discharge and vaginal pain.   Skin:  Negative for rash.     Medical History Reviewed by provider this encounter:  No text in SmartText       Objective     /70 (BP Location: Right arm, Patient Position: Sitting, Cuff Size: Adult)   Ht 5' 1\" (1.549 m)   Wt 71.7 kg (158 lb)   LMP 11/18/2023   BMI 29.85 kg/m²     Physical Exam    Physical Exam  Vitals and nursing note reviewed.   Constitutional:       General: She is not in acute distress.     Appearance: She is well-developed.   HENT:      Head: Normocephalic and atraumatic.   Eyes:      Conjunctiva/sclera: Conjunctivae normal.   Cardiovascular:      Rate and Rhythm: Normal rate and regular rhythm.      Heart sounds: No murmur heard.  Pulmonary:      Effort: Pulmonary effort is normal. No respiratory distress.      Breath sounds: Normal breath sounds.   Abdominal:      General: There is no distension.      Palpations: Abdomen is soft.      Tenderness: There is no abdominal tenderness. There is no guarding or rebound.   Genitourinary:     Labia:         Right: No rash, tenderness or lesion.         Left: No rash, tenderness or lesion.       Vagina: No signs of injury. No vaginal discharge or tenderness.      Cervix: No cervical motion tenderness, discharge, friability, lesion or erythema.      Uterus: Not enlarged and not tender.       Adnexa:         Right: No mass, tenderness or fullness.          Left: No mass, tenderness or fullness.     Musculoskeletal:         General: No swelling.      Cervical back: Neck supple.   Skin:     General: Skin is warm and dry.   Neurological:      Mental Status: She is alert.    Administrative Statements   I have spent a total time of 30 minutes on 05/15/24 In caring for this patient including Diagnostic results, " Risks and benefits of tx options, Instructions for management, Importance of tx compliance, Risk factor reductions, Impressions, Counseling / Coordination of care, Documenting in the medical record, Reviewing / ordering tests, medicine, procedures  , and Obtaining or reviewing history  .

## 2025-03-27 ENCOUNTER — HOSPITAL ENCOUNTER (OUTPATIENT)
Dept: RADIOLOGY | Age: 50
Discharge: HOME | End: 2025-03-27
Payer: COMMERCIAL

## 2025-03-27 VITALS — HEIGHT: 61 IN | BODY MASS INDEX: 31.15 KG/M2 | WEIGHT: 165 LBS

## 2025-03-27 DIAGNOSIS — N60.11 FIBROCYSTIC BREAST CHANGES OF BOTH BREASTS: ICD-10-CM

## 2025-03-27 DIAGNOSIS — Z80.3 FAMILY HISTORY OF BREAST CANCER: ICD-10-CM

## 2025-03-27 DIAGNOSIS — N60.12 FIBROCYSTIC BREAST CHANGES OF BOTH BREASTS: ICD-10-CM

## 2025-03-27 PROCEDURE — 76641 ULTRASOUND BREAST COMPLETE: CPT

## 2025-04-10 DIAGNOSIS — Z00.6 ENCOUNTER FOR EXAMINATION FOR NORMAL COMPARISON OR CONTROL IN CLINICAL RESEARCH PROGRAM: ICD-10-CM

## 2025-07-10 ENCOUNTER — ANNUAL EXAM (OUTPATIENT)
Dept: OBGYN CLINIC | Facility: CLINIC | Age: 50
End: 2025-07-10
Payer: COMMERCIAL

## 2025-07-10 VITALS — SYSTOLIC BLOOD PRESSURE: 118 MMHG | WEIGHT: 182 LBS | DIASTOLIC BLOOD PRESSURE: 72 MMHG | BODY MASS INDEX: 34.39 KG/M2

## 2025-07-10 DIAGNOSIS — R92.30 DENSE BREAST: Primary | ICD-10-CM

## 2025-07-10 DIAGNOSIS — Z12.31 ENCOUNTER FOR SCREENING MAMMOGRAM FOR BREAST CANCER: ICD-10-CM

## 2025-07-10 PROCEDURE — S0612 ANNUAL GYNECOLOGICAL EXAMINA: HCPCS | Performed by: OBSTETRICS & GYNECOLOGY

## 2025-07-10 NOTE — PROGRESS NOTES
Name: Sarahi Colmenares      : 1975      MRN: [id number]  Encounter Provider: Luis Almanza MD  Encounter Date: 7/10/2025   Encounter department: [facility]  :  Assessment & Plan  Encounter for screening mammogram for breast cancer  The patient was informed of a stable menopausal GYN examination.  She is taking hormone replacement therapy.  Will try over-the-counter estrogen first.  To do this for couple months and reassess let me know if she would like to do.  She is not happy with the way.  She may consider the [facility] weight loss management team.  She will continue get her mammograms colonoscopy as directed.  A Pap smear was not done today because of low risk.  Orders:    Mammo screening bilateral w 3d and cad; Future        History of Present Illness   HPI  Sarahi Colmenares is a 49 y.o. female who presents for her annual GYN examination.  She is a  3 para 2 with 2 prior  sections.  Her current method of contraception includes tubal ligation.  She is now menopausal.  She is complaining of problems sleeping and some hot flashes.  We talked about using over-the-counter Estroven.  She is interested in primary hormone replacement therapy later.  If these things do not improve she will be a good candidate for that.  We talked about the Vivelle-Dot and the Prometrium.  Currently she not taking any medication.  She feels safe at home.  She sees a dentist on a regular basis.  Her colonoscopy is up-to-date.  Next colonoscopy should be at age 54.  She is not happy with her weight gain.  She is gone from 156-182.  We talked about professional weight loss.  Her brochure was given to her.  There is no problem with intimacy.  There is no major  or GI complaint.  There are no major family illnesses to report.  Her PHQ score is total of 2 with a 0.4 #3 and a 0.5 #4 otherwise stable.  There is no problem depression.  Family history reviewed all negative.      Review of Systems   All  other systems reviewed and are negative.         Objective   /72   Wt 82.6 kg (182 lb)   LMP 2023   BMI 34.39 kg/m²      Physical Exam  Vitals reviewed. Exam conducted with a chaperone present.   Constitutional:       Appearance: Normal appearance.   HENT:      Head: Normocephalic and atraumatic.      Nose: Nose normal.      Mouth/Throat:      Mouth: Mucous membranes are moist.     Eyes:      Pupils: Pupils are equal, round, and reactive to light.       Cardiovascular:      Rate and Rhythm: Normal rate and regular rhythm.      Pulses: Normal pulses.      Heart sounds: Normal heart sounds.   Pulmonary:      Effort: Pulmonary effort is normal. No respiratory distress.      Breath sounds: Normal breath sounds. No stridor.   Abdominal:      General: A surgical scar is present. Bowel sounds are normal.      Palpations: There is no shifting dullness, fluid wave, hepatomegaly or splenomegaly.      Tenderness: There is no abdominal tenderness.      Hernia: No hernia is present. There is no hernia in the left inguinal area or right inguinal area.      Comments:  section scar well-healed x 2   Genitourinary:     General: Normal vulva.      Pubic Area: No rash or pubic lice.       Labia:         Right: No rash, tenderness, lesion or injury.         Left: No rash, tenderness, lesion or injury.       Urethra: No prolapse or urethral pain.      Vagina: Normal. No signs of injury and foreign body. No vaginal discharge, erythema, tenderness, bleeding, lesions or prolapsed vaginal walls.      Cervix: Normal. No cervical motion tenderness or friability.      Uterus: Normal.       Adnexa: Right adnexa normal and left adnexa normal.      Rectum: Normal.      Comments: The external genitalia normal limits the vagina is clean the cervix is closed uterus is anterior normal size there is no cervical motion tenderness.  There is no evidence of prolapse.  A Pap smear was not performed because of low risk.  The urethra  and bladder normal working relationship.    Musculoskeletal:         General: Normal range of motion.      Cervical back: Normal range of motion and neck supple.     Skin:     General: Skin is warm and dry.     Neurological:      General: No focal deficit present.      Mental Status: She is alert and oriented to person, place, and time.     Psychiatric:         Mood and Affect: Mood normal.         Behavior: Behavior normal.         Thought Content: Thought content normal.

## 2025-07-10 NOTE — PATIENT INSTRUCTIONS
The patient was informed of a stable menopausal GYN examination.  She is troubled by her lack of sleep and hot flashes.  She will try the over-the-counter Estroven.  If this is not effective she may consider hormone replacement therapy she will let us know.  She is not happy with her weight.  We advised her to try to increase her water intake, to get more sleep, and exercise more.  And also make arrangements for consultation without weight loss program.  She will keep us informed of her menopausal symptoms.  If having stable she return the office in 1 year.

## (undated) DEVICE — SUT VICRYL 0 CT-1 36 IN J946H

## (undated) DEVICE — Device

## (undated) DEVICE — TELFA NON-ADHERENT ABSORBENT DRESSING: Brand: TELFA

## (undated) DEVICE — ABDOMINAL PAD: Brand: DERMACEA

## (undated) DEVICE — CHLORAPREP HI-LITE 26ML ORANGE

## (undated) DEVICE — SUT VICRYL 0 CTX 36 IN J978H

## (undated) DEVICE — SUT VICRYL 0 CT-1 27 IN J260H

## (undated) DEVICE — GAUZE SPONGES,16 PLY: Brand: CURITY

## (undated) DEVICE — SKIN MARKER DUAL TIP WITH RULER CAP, FLEXIBLE RULER AND LABELS: Brand: DEVON

## (undated) DEVICE — PROXIMATE PLUS MD MULTI-DIRECTIONAL RELEASE SKIN STAPLERS CONTAINS 35 STAINLESS STEEL STAPLES APPROXIMATE CLOSED DIMENSIONS: 6.9MM X 3.9MM WIDE: Brand: PROXIMATE

## (undated) DEVICE — GLOVE SRG BIOGEL ORTHOPEDIC 7

## (undated) DEVICE — PACK C-SECTION PBDS